# Patient Record
Sex: MALE | Race: WHITE | NOT HISPANIC OR LATINO | Employment: OTHER | URBAN - METROPOLITAN AREA
[De-identification: names, ages, dates, MRNs, and addresses within clinical notes are randomized per-mention and may not be internally consistent; named-entity substitution may affect disease eponyms.]

---

## 2024-01-04 ENCOUNTER — HOSPITAL ENCOUNTER (INPATIENT)
Facility: HOSPITAL | Age: 59
LOS: 2 days | Discharge: HOME | DRG: 174 | End: 2024-01-06
Attending: EMERGENCY MEDICINE | Admitting: FAMILY MEDICINE
Payer: COMMERCIAL

## 2024-01-04 ENCOUNTER — APPOINTMENT (EMERGENCY)
Dept: CT IMAGING | Facility: HOSPITAL | Age: 59
DRG: 174 | End: 2024-01-04
Payer: COMMERCIAL

## 2024-01-04 ENCOUNTER — APPOINTMENT (EMERGENCY)
Dept: RADIOLOGY | Facility: HOSPITAL | Age: 59
DRG: 174 | End: 2024-01-04
Payer: COMMERCIAL

## 2024-01-04 ENCOUNTER — APPOINTMENT (INPATIENT)
Dept: CT IMAGING | Facility: HOSPITAL | Age: 59
DRG: 174 | End: 2024-01-04
Payer: COMMERCIAL

## 2024-01-04 DIAGNOSIS — R00.1 BRADYCARDIA, SINUS: ICD-10-CM

## 2024-01-04 DIAGNOSIS — R11.2 NAUSEA AND VOMITING, UNSPECIFIED VOMITING TYPE: ICD-10-CM

## 2024-01-04 DIAGNOSIS — R07.9 CHEST PAIN, UNSPECIFIED TYPE: ICD-10-CM

## 2024-01-04 DIAGNOSIS — R79.89 ELEVATED TROPONIN: ICD-10-CM

## 2024-01-04 DIAGNOSIS — I21.4 NSTEMI (NON-ST ELEVATED MYOCARDIAL INFARCTION) (HCC): Primary | ICD-10-CM

## 2024-01-04 PROBLEM — D72.829 LEUKOCYTOSIS: Status: ACTIVE | Noted: 2024-01-04

## 2024-01-04 PROBLEM — R07.89 OTHER CHEST PAIN: Status: ACTIVE | Noted: 2024-01-04

## 2024-01-04 PROBLEM — I16.0 HYPERTENSIVE URGENCY: Status: ACTIVE | Noted: 2024-01-04

## 2024-01-04 LAB
2HR DELTA HS TROPONIN: 47 NG/L
4HR DELTA HS TROPONIN: 259 NG/L
ALBUMIN SERPL BCP-MCNC: 5 G/DL (ref 3.5–5)
ALP SERPL-CCNC: 91 U/L (ref 34–104)
ALT SERPL W P-5'-P-CCNC: 42 U/L (ref 7–52)
ANION GAP SERPL CALCULATED.3IONS-SCNC: 9 MMOL/L
APTT PPP: 26 SECONDS (ref 23–37)
APTT PPP: 39 SECONDS (ref 23–37)
AST SERPL W P-5'-P-CCNC: 23 U/L (ref 13–39)
ATRIAL RATE: 48 BPM
ATRIAL RATE: 49 BPM
ATRIAL RATE: 65 BPM
ATRIAL RATE: 70 BPM
BASOPHILS # BLD AUTO: 0.09 THOUSANDS/ÂΜL (ref 0–0.1)
BASOPHILS NFR BLD AUTO: 1 % (ref 0–1)
BILIRUB SERPL-MCNC: 0.45 MG/DL (ref 0.2–1)
BUN SERPL-MCNC: 18 MG/DL (ref 5–25)
CALCIUM SERPL-MCNC: 9.7 MG/DL (ref 8.4–10.2)
CARDIAC TROPONIN I PNL SERPL HS: 267 NG/L
CARDIAC TROPONIN I PNL SERPL HS: 55 NG/L
CARDIAC TROPONIN I PNL SERPL HS: 8 NG/L
CHLORIDE SERPL-SCNC: 103 MMOL/L (ref 96–108)
CO2 SERPL-SCNC: 24 MMOL/L (ref 21–32)
CREAT SERPL-MCNC: 1 MG/DL (ref 0.6–1.3)
EOSINOPHIL # BLD AUTO: 0.22 THOUSAND/ÂΜL (ref 0–0.61)
EOSINOPHIL NFR BLD AUTO: 2 % (ref 0–6)
ERYTHROCYTE [DISTWIDTH] IN BLOOD BY AUTOMATED COUNT: 12.6 % (ref 11.6–15.1)
FLUAV RNA RESP QL NAA+PROBE: NEGATIVE
FLUBV RNA RESP QL NAA+PROBE: NEGATIVE
GFR SERPL CREATININE-BSD FRML MDRD: 81 ML/MIN/1.73SQ M
GLUCOSE SERPL-MCNC: 103 MG/DL (ref 65–140)
HCT VFR BLD AUTO: 44.6 % (ref 36.5–49.3)
HGB BLD-MCNC: 15.1 G/DL (ref 12–17)
IMM GRANULOCYTES # BLD AUTO: 0.03 THOUSAND/UL (ref 0–0.2)
IMM GRANULOCYTES NFR BLD AUTO: 0 % (ref 0–2)
INR PPP: 0.92 (ref 0.84–1.19)
LYMPHOCYTES # BLD AUTO: 4.4 THOUSANDS/ÂΜL (ref 0.6–4.47)
LYMPHOCYTES NFR BLD AUTO: 42 % (ref 14–44)
MCH RBC QN AUTO: 28.5 PG (ref 26.8–34.3)
MCHC RBC AUTO-ENTMCNC: 33.9 G/DL (ref 31.4–37.4)
MCV RBC AUTO: 84 FL (ref 82–98)
MONOCYTES # BLD AUTO: 1.11 THOUSAND/ÂΜL (ref 0.17–1.22)
MONOCYTES NFR BLD AUTO: 11 % (ref 4–12)
NEUTROPHILS # BLD AUTO: 4.61 THOUSANDS/ÂΜL (ref 1.85–7.62)
NEUTS SEG NFR BLD AUTO: 44 % (ref 43–75)
NRBC BLD AUTO-RTO: 0 /100 WBCS
P AXIS: 44 DEGREES
P AXIS: 46 DEGREES
P AXIS: 57 DEGREES
P AXIS: 67 DEGREES
PLATELET # BLD AUTO: 320 THOUSANDS/UL (ref 149–390)
PMV BLD AUTO: 9 FL (ref 8.9–12.7)
POTASSIUM SERPL-SCNC: 4 MMOL/L (ref 3.5–5.3)
PR INTERVAL: 172 MS
PR INTERVAL: 174 MS
PR INTERVAL: 174 MS
PR INTERVAL: 178 MS
PROT SERPL-MCNC: 7.7 G/DL (ref 6.4–8.4)
PROTHROMBIN TIME: 12.9 SECONDS (ref 11.6–14.5)
QRS AXIS: 11 DEGREES
QRS AXIS: 20 DEGREES
QRS AXIS: 21 DEGREES
QRS AXIS: 48 DEGREES
QRSD INTERVAL: 78 MS
QRSD INTERVAL: 78 MS
QRSD INTERVAL: 82 MS
QRSD INTERVAL: 84 MS
QT INTERVAL: 388 MS
QT INTERVAL: 402 MS
QT INTERVAL: 436 MS
QT INTERVAL: 454 MS
QTC INTERVAL: 389 MS
QTC INTERVAL: 410 MS
QTC INTERVAL: 418 MS
QTC INTERVAL: 419 MS
RBC # BLD AUTO: 5.29 MILLION/UL (ref 3.88–5.62)
RSV RNA RESP QL NAA+PROBE: NEGATIVE
SARS-COV-2 RNA RESP QL NAA+PROBE: NEGATIVE
SODIUM SERPL-SCNC: 136 MMOL/L (ref 135–147)
T WAVE AXIS: 12 DEGREES
T WAVE AXIS: 20 DEGREES
T WAVE AXIS: 44 DEGREES
T WAVE AXIS: 9 DEGREES
VENTRICULAR RATE: 48 BPM
VENTRICULAR RATE: 49 BPM
VENTRICULAR RATE: 65 BPM
VENTRICULAR RATE: 70 BPM
WBC # BLD AUTO: 10.46 THOUSAND/UL (ref 4.31–10.16)

## 2024-01-04 PROCEDURE — 85730 THROMBOPLASTIN TIME PARTIAL: CPT | Performed by: EMERGENCY MEDICINE

## 2024-01-04 PROCEDURE — 99291 CRITICAL CARE FIRST HOUR: CPT | Performed by: EMERGENCY MEDICINE

## 2024-01-04 PROCEDURE — 85610 PROTHROMBIN TIME: CPT | Performed by: EMERGENCY MEDICINE

## 2024-01-04 PROCEDURE — 96374 THER/PROPH/DIAG INJ IV PUSH: CPT

## 2024-01-04 PROCEDURE — 93010 ELECTROCARDIOGRAM REPORT: CPT | Performed by: INTERNAL MEDICINE

## 2024-01-04 PROCEDURE — 71045 X-RAY EXAM CHEST 1 VIEW: CPT

## 2024-01-04 PROCEDURE — 71275 CT ANGIOGRAPHY CHEST: CPT

## 2024-01-04 PROCEDURE — 36415 COLL VENOUS BLD VENIPUNCTURE: CPT

## 2024-01-04 PROCEDURE — 73200 CT UPPER EXTREMITY W/O DYE: CPT

## 2024-01-04 PROCEDURE — 85025 COMPLETE CBC W/AUTO DIFF WBC: CPT | Performed by: EMERGENCY MEDICINE

## 2024-01-04 PROCEDURE — 93005 ELECTROCARDIOGRAM TRACING: CPT

## 2024-01-04 PROCEDURE — 99223 1ST HOSP IP/OBS HIGH 75: CPT | Performed by: FAMILY MEDICINE

## 2024-01-04 PROCEDURE — 72125 CT NECK SPINE W/O DYE: CPT

## 2024-01-04 PROCEDURE — 85730 THROMBOPLASTIN TIME PARTIAL: CPT | Performed by: FAMILY MEDICINE

## 2024-01-04 PROCEDURE — 96375 TX/PRO/DX INJ NEW DRUG ADDON: CPT

## 2024-01-04 PROCEDURE — 99285 EMERGENCY DEPT VISIT HI MDM: CPT

## 2024-01-04 PROCEDURE — 84484 ASSAY OF TROPONIN QUANT: CPT | Performed by: EMERGENCY MEDICINE

## 2024-01-04 PROCEDURE — 0241U HB NFCT DS VIR RESP RNA 4 TRGT: CPT | Performed by: EMERGENCY MEDICINE

## 2024-01-04 PROCEDURE — 80053 COMPREHEN METABOLIC PANEL: CPT | Performed by: EMERGENCY MEDICINE

## 2024-01-04 PROCEDURE — 74174 CTA ABD&PLVS W/CONTRAST: CPT

## 2024-01-04 RX ORDER — MORPHINE SULFATE 10 MG/ML
6 INJECTION, SOLUTION INTRAMUSCULAR; INTRAVENOUS ONCE
Status: COMPLETED | OUTPATIENT
Start: 2024-01-04 | End: 2024-01-04

## 2024-01-04 RX ORDER — CITALOPRAM 20 MG/1
20 TABLET ORAL DAILY
COMMUNITY

## 2024-01-04 RX ORDER — HEPARIN SODIUM 1000 [USP'U]/ML
2000 INJECTION, SOLUTION INTRAVENOUS; SUBCUTANEOUS EVERY 6 HOURS PRN
Status: DISCONTINUED | OUTPATIENT
Start: 2024-01-04 | End: 2024-01-05

## 2024-01-04 RX ORDER — HEPARIN SODIUM 1000 [USP'U]/ML
4000 INJECTION, SOLUTION INTRAVENOUS; SUBCUTANEOUS ONCE
Status: COMPLETED | OUTPATIENT
Start: 2024-01-04 | End: 2024-01-04

## 2024-01-04 RX ORDER — HEPARIN SODIUM 1000 [USP'U]/ML
4000 INJECTION, SOLUTION INTRAVENOUS; SUBCUTANEOUS EVERY 6 HOURS PRN
Status: DISCONTINUED | OUTPATIENT
Start: 2024-01-04 | End: 2024-01-05

## 2024-01-04 RX ORDER — PANTOPRAZOLE SODIUM 40 MG/1
40 TABLET, DELAYED RELEASE ORAL
Status: DISCONTINUED | OUTPATIENT
Start: 2024-01-05 | End: 2024-01-06 | Stop reason: HOSPADM

## 2024-01-04 RX ORDER — NITROGLYCERIN 0.4 MG/1
0.4 TABLET SUBLINGUAL ONCE
Status: COMPLETED | OUTPATIENT
Start: 2024-01-04 | End: 2024-01-04

## 2024-01-04 RX ORDER — HEPARIN SODIUM 10000 [USP'U]/100ML
3-20 INJECTION, SOLUTION INTRAVENOUS
Status: DISCONTINUED | OUTPATIENT
Start: 2024-01-04 | End: 2024-01-05

## 2024-01-04 RX ORDER — HYDROMORPHONE HCL/PF 1 MG/ML
1 SYRINGE (ML) INJECTION ONCE
Status: COMPLETED | OUTPATIENT
Start: 2024-01-04 | End: 2024-01-04

## 2024-01-04 RX ORDER — OMEPRAZOLE 40 MG/1
40 CAPSULE, DELAYED RELEASE ORAL DAILY
COMMUNITY

## 2024-01-04 RX ORDER — ACETAMINOPHEN 325 MG/1
650 TABLET ORAL EVERY 6 HOURS PRN
Status: DISCONTINUED | OUTPATIENT
Start: 2024-01-04 | End: 2024-01-06 | Stop reason: HOSPADM

## 2024-01-04 RX ORDER — ASPIRIN 81 MG/1
81 TABLET, CHEWABLE ORAL DAILY
Status: DISCONTINUED | OUTPATIENT
Start: 2024-01-05 | End: 2024-01-06 | Stop reason: HOSPADM

## 2024-01-04 RX ORDER — CITALOPRAM 20 MG/1
20 TABLET ORAL DAILY
Status: DISCONTINUED | OUTPATIENT
Start: 2024-01-05 | End: 2024-01-06 | Stop reason: HOSPADM

## 2024-01-04 RX ORDER — LANOLIN ALCOHOL/MO/W.PET/CERES
3 CREAM (GRAM) TOPICAL ONCE
Status: COMPLETED | OUTPATIENT
Start: 2024-01-04 | End: 2024-01-04

## 2024-01-04 RX ORDER — ASPIRIN 81 MG/1
324 TABLET, CHEWABLE ORAL ONCE
Status: COMPLETED | OUTPATIENT
Start: 2024-01-04 | End: 2024-01-04

## 2024-01-04 RX ORDER — ATENOLOL 25 MG/1
25 TABLET ORAL DAILY
COMMUNITY
End: 2024-01-06

## 2024-01-04 RX ADMIN — MORPHINE SULFATE 6 MG: 10 INJECTION INTRAVENOUS at 13:03

## 2024-01-04 RX ADMIN — HEPARIN SODIUM 11.1 UNITS/KG/HR: 10000 INJECTION, SOLUTION INTRAVENOUS at 15:43

## 2024-01-04 RX ADMIN — Medication 3 MG: at 22:25

## 2024-01-04 RX ADMIN — HEPARIN SODIUM 4000 UNITS: 1000 INJECTION INTRAVENOUS; SUBCUTANEOUS at 15:42

## 2024-01-04 RX ADMIN — HEPARIN SODIUM 4000 UNITS: 1000 INJECTION INTRAVENOUS; SUBCUTANEOUS at 22:24

## 2024-01-04 RX ADMIN — NITROGLYCERIN 0.4 MG: 0.4 TABLET SUBLINGUAL at 12:43

## 2024-01-04 RX ADMIN — IOHEXOL 100 ML: 350 INJECTION, SOLUTION INTRAVENOUS at 13:11

## 2024-01-04 RX ADMIN — NITROGLYCERIN 1 INCH: 20 OINTMENT TOPICAL at 13:21

## 2024-01-04 RX ADMIN — ASPIRIN 324 MG: 81 TABLET, CHEWABLE ORAL at 12:44

## 2024-01-04 RX ADMIN — HYDROMORPHONE HYDROCHLORIDE 1 MG: 1 INJECTION, SOLUTION INTRAMUSCULAR; INTRAVENOUS; SUBCUTANEOUS at 14:14

## 2024-01-04 NOTE — ASSESSMENT & PLAN NOTE
Patient presented to the ED with complaints of sudden onset of chest pain with nausea/diaphoresis for 2 hours prior to coming into the hospital.  No records to review, EKG with NSR.  Monitor on telemetry  Cardiology consulted, appreciate input  Will keep patient NPO after midnight in anticipation for possible cardiac cath.

## 2024-01-04 NOTE — ASSESSMENT & PLAN NOTE
Initial trop on admission was 8, 2 hr delta increased to 55, 4 hr is pending.  Initiated on Heparin drip, will continue at this time  Cardiology consult placed  SL Nitro as needed for chest pain  If 3rd troponin continues to rise, will continue to trend until peak.

## 2024-01-04 NOTE — ASSESSMENT & PLAN NOTE
Noted in the ER, heart rate in 40-50s  Patient cannot recall if he has history of SB or not.  Does state he has some dizziness at this time  Will continue to monitor patient on telemetry

## 2024-01-04 NOTE — ASSESSMENT & PLAN NOTE
Present on admission, evidenced by blood pressures ranging from 163-198/  Patient does report history of hypertension and reports that he is taking 25 mg Atenolol daily at home.  Blood pressure is improving in the ER, 146/85

## 2024-01-04 NOTE — ASSESSMENT & PLAN NOTE
Present on admission with a leukocytosis of 10.46  Not meeting SIRS criteria  Likely reactive, no sign of active infection  Will continue to monitor

## 2024-01-04 NOTE — H&P
[facility]  H&P  Name: Jaime Noble 58 y.o. male I MRN: [id number]  Unit/Bed#: ED 17 I Date of Admission: 1/4/2024   Date of Service: 1/4/2024 I Hospital Day: 0      Assessment/Plan   * Chest pain, unspecified  Assessment & Plan  Patient presented to the ED with complaints of sudden onset of chest pain with nausea/diaphoresis for 2 hours prior to coming into the hospital.  No records to review, EKG with NSR.  Monitor on telemetry  Cardiology consulted, appreciate input  Will keep patient NPO after midnight in anticipation for possible cardiac cath.    Bradycardia, sinus  Assessment & Plan  Noted in the ER, heart rate in 40-50s  Patient cannot recall if he has history of SB or not.  Does state he has some dizziness at this time  Will continue to monitor patient on telemetry    Hypertensive urgency  Assessment & Plan  Present on admission, evidenced by blood pressures ranging from 163-198/  Patient does report history of hypertension and reports that he is taking 25 mg Atenolol daily at home.  Blood pressure is improving in the ER, 146/85    Elevated troponin  Assessment & Plan  Initial trop on admission was 8, 2 hr delta increased to 55, 4 hr is pending.  Initiated on Heparin drip, will continue at this time  Cardiology consult placed  SL Nitro as needed for chest pain  If 3rd troponin continues to rise, will continue to trend until peak.    Leukocytosis  Assessment & Plan  Present on admission with a leukocytosis of 10.46  Not meeting SIRS criteria  Likely reactive, no sign of active infection  Will continue to monitor           VTE Pharmacologic Prophylaxis: VTE Score: 3 Moderate Risk (Score 3-4) - Pharmacological DVT Prophylaxis Ordered: heparin.  Code Status: No Order Full Code  Discussion with family: Patient declined call to .     Anticipated Length of Stay: Patient will be admitted on an inpatient basis with an anticipated length of stay of greater than 2 midnights  secondary to Chest pain, elevated troponin levels and need for cardiology evaluation.    Total Time Spent on Date of Encounter in care of patient: 65 mins. This time was spent on one or more of the following: performing physical exam; counseling and coordination of care; obtaining or reviewing history; documenting in the medical record; reviewing/ordering tests, medications or procedures; communicating with other healthcare professionals and discussing with patient's family/caregivers.    Chief Complaint: Chest pain, nausea/diaphoresis, and arm pain    History of Present Illness:  Jaime Noble is a 58 y.o. male with a PMH of Hypertension who presents with chest pain that began approx 2 hours prior to presentation to the hospital, including diaphoresis and nausea.  Also endorses complaints of dizziness.  Kathrine any new medications or vitamins added to his regimen.  Denies history of hyperlipidemia.  No history of CAD that he knows of.    Review of Systems:  Review of Systems   Constitutional:  Positive for diaphoresis. Negative for activity change, appetite change, chills and fever.   HENT:  Negative for congestion, dental problem, ear pain and sore throat.    Eyes:  Negative for pain and visual disturbance.   Respiratory:  Negative for apnea, cough, chest tightness and shortness of breath.    Cardiovascular:  Positive for chest pain. Negative for palpitations and leg swelling.   Gastrointestinal:  Positive for nausea. Negative for abdominal pain, constipation and vomiting.   Endocrine: Negative for cold intolerance, heat intolerance, polydipsia and polyphagia.   Genitourinary:  Negative for difficulty urinating, dysuria, hematuria and scrotal swelling.   Musculoskeletal:  Positive for arthralgias (Right shoulder pain). Negative for back pain, myalgias and neck pain.   Skin:  Negative for color change and rash.   Allergic/Immunologic: Negative for environmental allergies, food allergies and immunocompromised state.    Neurological:  Positive for dizziness. Negative for seizures and syncope.   Hematological:  Negative for adenopathy. Does not bruise/bleed easily.   Psychiatric/Behavioral:  Negative for agitation, decreased concentration and dysphoric mood. The patient is not nervous/anxious.    All other systems reviewed and are negative.      Past Medical and Surgical History:   Past Medical History:   Diagnosis Date    Hypertension     Reflux gastritis        Past Surgical History:   Procedure Laterality Date    NOSE SURGERY         Meds/Allergies:  Prior to Admission medications    Medication Sig Start Date End Date Taking? Authorizing Provider   atenolol (TENORMIN) 25 mg tablet Take 25 mg by mouth daily   Yes Historical Provider, MD   citalopram (CeleXA) 20 mg tablet Take 20 mg by mouth daily   Yes Historical Provider, MD   omeprazole (PriLOSEC) 40 MG capsule Take 40 mg by mouth daily   Yes Historical Provider, MD     I have reviewed home medications with patient personally.    Allergies:   Allergies   Allergen Reactions    Ketorolac Rash       Social History:  Marital Status: Single   Occupation:   Patient Pre-hospital Living Situation: Home  Patient Pre-hospital Level of Mobility: walks  Patient Pre-hospital Diet Restrictions:   Substance Use History:   Social History     Substance and Sexual Activity   Alcohol Use Never     Social History     Tobacco Use   Smoking Status Never   Smokeless Tobacco Never     Social History     Substance and Sexual Activity   Drug Use Never       Family History:  History reviewed. No pertinent family history.    Physical Exam:     Vitals:   Blood Pressure: 140/76 (01/04/24 1511)  Pulse: 56 (01/04/24 1511)  Temperature: (!) 97.4 °F (36.3 °C) (01/04/24 1234)  Temp Source: Temporal (01/04/24 1234)  Respirations: 20 (01/04/24 1511)  Weight - Scale: 97.4 kg (214 lb 11.7 oz) (01/04/24 1332)  SpO2: 92 % (01/04/24 1511)    Physical Exam  Vitals and nursing note reviewed.   Constitutional:        General: He is not in acute distress.     Appearance: He is obese.   Cardiovascular:      Rate and Rhythm: Regular rhythm. Bradycardia present.      Pulses: Normal pulses.      Heart sounds: Normal heart sounds.   Pulmonary:      Effort: Pulmonary effort is normal.      Breath sounds: Normal breath sounds.   Abdominal:      General: Bowel sounds are normal.      Palpations: Abdomen is soft.   Musculoskeletal:         General: Normal range of motion.      Right lower leg: No edema.      Left lower leg: No edema.   Skin:     General: Skin is warm and dry.   Neurological:      Mental Status: He is alert and oriented to person, place, and time.   Psychiatric:         Mood and Affect: Mood normal.          Additional Data:     Lab Results:  Results from last 7 days   Lab Units 01/04/24  1239   WBC Thousand/uL 10.46*   HEMOGLOBIN g/dL 15.1   HEMATOCRIT % 44.6   PLATELETS Thousands/uL 320   NEUTROS PCT % 44   LYMPHS PCT % 42   MONOS PCT % 11   EOS PCT % 2     Results from last 7 days   Lab Units 01/04/24  1239   SODIUM mmol/L 136   POTASSIUM mmol/L 4.0   CHLORIDE mmol/L 103   CO2 mmol/L 24   BUN mg/dL 18   CREATININE mg/dL 1.00   ANION GAP mmol/L 9   CALCIUM mg/dL 9.7   ALBUMIN g/dL 5.0   TOTAL BILIRUBIN mg/dL 0.45   ALK PHOS U/L 91   ALT U/L 42   AST U/L 23   GLUCOSE RANDOM mg/dL 103     Results from last 7 days   Lab Units 01/04/24  1534   INR  0.92                   Lines/Drains:  Invasive Devices       Peripheral Intravenous Line  Duration             Peripheral IV 01/04/24 Dorsal (posterior);Right Hand <1 day    Peripheral IV 01/04/24 Left;Dorsal (posterior) Forearm <1 day                        Imaging: Reviewed radiology reports from this admission including: chest CT scan and abdominal/pelvic CT  CTA dissection protocol chest/abdomen/pelvis   Final Result by Martir Hdz MD (01/04 1400)      1.  No acute aortic, thoracic or abdominopelvic pathology.   2.  Hepatic steatosis.               Workstation performed:  [id number]         XR chest 1 view portable   Final Result by Martir Hdz MD (01/04 1400)      No acute cardiopulmonary disease.                  Workstation performed: [id number]             EKG and Other Studies Reviewed on Admission:   EKG: Sinus Bradycardia. HR 40-50.    ** Please Note: This note has been constructed using a voice recognition system. **

## 2024-01-04 NOTE — ED PROVIDER NOTES
History  Chief Complaint   Patient presents with    Chest Pain     Patient c/o chest pain that started this morning. Patient c/o right arm heaviness.     CP and diffuse aching pressure like pain in both shoulders, onset 2 hours ago. Nausea and cold sweats but no vomiting. No dyspnea. Pain radiates into R shoulder and R biceps. No h/o similar pain in past. No h/o CAD. Nonsmoker. No leg pain or swelling. No cough or hemoptysis.         None       Past Medical History:   Diagnosis Date    Hypertension     Reflux gastritis        Past Surgical History:   Procedure Laterality Date    NOSE SURGERY         History reviewed. No pertinent family history.  I have reviewed and agree with the history as documented.    E-Cigarette/Vaping    E-Cigarette Use Never User      E-Cigarette/Vaping Substances     Social History     Tobacco Use    Smoking status: Never    Smokeless tobacco: Never   Vaping Use    Vaping status: Never Used   Substance Use Topics    Alcohol use: Never    Drug use: Never       Review of Systems   Constitutional:  Positive for diaphoresis. Negative for chills and fever.   Cardiovascular:  Positive for chest pain.       Physical Exam  Physical Exam  Vitals and nursing note reviewed.   Constitutional:       General: He is not in acute distress.     Appearance: He is well-developed. He is ill-appearing. He is not toxic-appearing or diaphoretic.   HENT:      Head: Normocephalic and atraumatic.      Mouth/Throat:      Mouth: Mucous membranes are moist.      Pharynx: Oropharynx is clear.   Eyes:      Conjunctiva/sclera: Conjunctivae normal.      Pupils: Pupils are equal, round, and reactive to light.   Neck:      Vascular: No JVD.   Cardiovascular:      Rate and Rhythm: Normal rate and regular rhythm.      Pulses: Normal pulses.      Heart sounds: Normal heart sounds. No murmur heard.     No friction rub. No gallop.   Pulmonary:      Effort: Pulmonary effort is normal. No respiratory distress.      Breath sounds:  Normal breath sounds. No stridor. No wheezing or rales.   Abdominal:      General: There is no distension.      Palpations: Abdomen is soft.      Tenderness: There is no abdominal tenderness. There is no guarding or rebound.   Musculoskeletal:         General: No swelling, tenderness, deformity or signs of injury. Normal range of motion.      Cervical back: Normal range of motion and neck supple. No rigidity.   Skin:     General: Skin is warm and dry.      Capillary Refill: Capillary refill takes less than 2 seconds.      Coloration: Skin is not cyanotic, jaundiced or pale.      Findings: No bruising, ecchymosis or erythema.   Neurological:      General: No focal deficit present.      Mental Status: He is alert and oriented to person, place, and time.      Cranial Nerves: No cranial nerve deficit.      Sensory: No sensory deficit.      Motor: No weakness or abnormal muscle tone.      Coordination: Coordination normal.      Gait: Gait normal.         Vital Signs  ED Triage Vitals   Temperature Pulse Respirations Blood Pressure SpO2   01/04/24 1234 01/04/24 1234 01/04/24 1234 01/04/24 1234 01/04/24 1234   (!) 97.4 °F (36.3 °C) 65 20 (!) 186/120 97 %      Temp Source Heart Rate Source Patient Position - Orthostatic VS BP Location FiO2 (%)   01/04/24 1234 01/04/24 1234 01/04/24 1234 01/04/24 1234 --   Temporal Monitor Sitting Left arm       Pain Score       01/04/24 1303       9           Vitals:    01/04/24 1300 01/04/24 1330 01/04/24 1400 01/04/24 1511   BP: (!) 163/108 (!) 168/102 (!) 198/99 140/76   Pulse: 61 61 59 56   Patient Position - Orthostatic VS: Sitting Sitting Sitting Lying         Visual Acuity      ED Medications  Medications   heparin (porcine) injection 4,000 Units (has no administration in time range)   heparin (porcine) 25,000 units in 0.45% NaCl 250 mL infusion (premix) (has no administration in time range)   heparin (porcine) injection 4,000 Units (has no administration in time range)   heparin  (porcine) injection 2,000 Units (has no administration in time range)   nitroglycerin (NITROSTAT) SL tablet 0.4 mg (0.4 mg Sublingual Given 1/4/24 1243)   aspirin chewable tablet 324 mg (324 mg Oral Given 1/4/24 1244)   morphine injection 6 mg (6 mg Intravenous Given 1/4/24 1303)   iohexol (OMNIPAQUE) 350 MG/ML injection (MULTI-DOSE) 100 mL (100 mL Intravenous Given 1/4/24 1311)   nitroglycerin (NITRO-BID) 2 % TD ointment 1 inch (1 inch Topical Given 1/4/24 1321)   HYDROmorphone (DILAUDID) injection 1 mg (1 mg Intravenous Given 1/4/24 1414)       Diagnostic Studies  Results Reviewed       Procedure Component Value Units Date/Time    APTT [[id number]] Collected: 01/04/24 1534    Lab Status: In process Specimen: Blood from Arm, Right Updated: 01/04/24 1537    Protime-INR [[id number]] Collected: 01/04/24 1534    Lab Status: In process Specimen: Blood from Arm, Right Updated: 01/04/24 1537    HS Troponin I 2hr [[id number]]  (Abnormal) Collected: 01/04/24 1438    Lab Status: Final result Specimen: Blood from Hand, Right Updated: 01/04/24 1513     hs TnI 2hr 55 ng/L      Delta 2hr hsTnI 47 ng/L     HS Troponin I 4hr [[id number]]     Lab Status: No result Specimen: Blood     FLU/RSV/COVID - if FLU/RSV clinically relevant [[id number]]  (Normal) Collected: 01/04/24 1301    Lab Status: Final result Specimen: Nares from Nose Updated: 01/04/24 1351     SARS-CoV-2 Negative     INFLUENZA A PCR Negative     INFLUENZA B PCR Negative     RSV PCR Negative    Narrative:      FOR PEDIATRIC PATIENTS - copy/paste COVID Guidelines URL to browser: [email or web address]    SARS-CoV-2 assay is a Nucleic Acid Amplification assay intended for the  qualitative detection of nucleic acid from SARS-CoV-2 in nasopharyngeal  swabs. Results are for the presumptive identification of SARS-CoV-2 RNA.    Positive results are indicative of infection with SARS-CoV-2, the virus  causing COVID-19, but do not  rule out bacterial infection or co-infection  with other viruses. Laboratories within the [place] and its  territories are required to report all positive results to the appropriate  public health authorities. Negative results do not preclude SARS-CoV-2  infection and should not be used as the sole basis for treatment or other  patient management decisions. Negative results must be combined with  clinical observations, patient history, and epidemiological information.  This test has not been FDA cleared or approved.    This test has been authorized by FDA under an Emergency Use Authorization  (EUA). This test is only authorized for the duration of time the  declaration that circumstances exist justifying the authorization of the  emergency use of an in vitro diagnostic tests for detection of SARS-CoV-2  virus and/or diagnosis of COVID-19 infection under section 564(b)(1) of  the Act, 21 U.S.C. 360bbb-3(b)(1), unless the authorization is terminated  or revoked sooner. The test has been validated but independent review by FDA  and CLIA is pending.    Test performed using [employer] Gene[employer]pert: This RT-PCR assay targets N2,  a region unique to SARS-CoV-2. A conserved region in the E-gene was chosen  for pan-Sarbecovirus detection which includes SARS-CoV-2.    According to CMS-2020-01-R, this platform meets the definition of high-throughput technology.    HS Troponin 0hr (reflex protocol) [[id number]]  (Normal) Collected: 01/04/24 1239    Lab Status: Final result Specimen: Blood from Arm, Left Updated: 01/04/24 1317     hs TnI 0hr 8 ng/L     Comprehensive metabolic panel [[id number]] Collected: 01/04/24 1239    Lab Status: Final result Specimen: Blood from Arm, Left Updated: 01/04/24 1309     Sodium 136 mmol/L      Potassium 4.0 mmol/L      Chloride 103 mmol/L      CO2 24 mmol/L      ANION GAP 9 mmol/L      BUN 18 mg/dL      Creatinine 1.00 mg/dL      Glucose 103 mg/dL      Calcium 9.7 mg/dL      AST 23 U/L      ALT 42  U/L      Alkaline Phosphatase 91 U/L      Total Protein 7.7 g/dL      Albumin 5.0 g/dL      Total Bilirubin 0.45 mg/dL      eGFR 81 ml/min/1.73sq m     Narrative:      National Kidney Disease Foundation guidelines for Chronic Kidney Disease (CKD):     Stage 1 with normal or high GFR (GFR > 90 mL/min/1.73 square meters)    Stage 2 Mild CKD (GFR = 60-89 mL/min/1.73 square meters)    Stage 3A Moderate CKD (GFR = 45-59 mL/min/1.73 square meters)    Stage 3B Moderate CKD (GFR = 30-44 mL/min/1.73 square meters)    Stage 4 Severe CKD (GFR = 15-29 mL/min/1.73 square meters)    Stage 5 End Stage CKD (GFR <15 mL/min/1.73 square meters)  Note: GFR calculation is accurate only with a steady state creatinine    CBC and differential [[id number]]  (Abnormal) Collected: 01/04/24 1239    Lab Status: Final result Specimen: Blood from Arm, Left Updated: 01/04/24 1251     WBC 10.46 Thousand/uL      RBC 5.29 Million/uL      Hemoglobin 15.1 g/dL      Hematocrit 44.6 %      MCV 84 fL      MCH 28.5 pg      MCHC 33.9 g/dL      RDW 12.6 %      MPV 9.0 fL      Platelets 320 Thousands/uL      nRBC 0 /100 WBCs      Neutrophils Relative 44 %      Immat GRANS % 0 %      Lymphocytes Relative 42 %      Monocytes Relative 11 %      Eosinophils Relative 2 %      Basophils Relative 1 %      Neutrophils Absolute 4.61 Thousands/µL      Immature Grans Absolute 0.03 Thousand/uL      Lymphocytes Absolute 4.40 Thousands/µL      Monocytes Absolute 1.11 Thousand/µL      Eosinophils Absolute 0.22 Thousand/µL      Basophils Absolute 0.09 Thousands/µL                    CTA dissection protocol chest/abdomen/pelvis   Final Result by Martir Hdz MD (01/04 1400)      1.  No acute aortic, thoracic or abdominopelvic pathology.   2.  Hepatic steatosis.               Workstation performed: [id number]         XR chest 1 view portable   Final Result by Martir Hdz MD (01/04 1400)      No acute cardiopulmonary disease.                  Workstation performed:  [id number]                    Procedures  ECG 12 Lead Documentation Only    Date/Time: 1/4/2024 12:37 PM    Performed by: Adolph Carreno MD  Authorized by: Adolph Carreno MD    ECG reviewed by me, the ED Provider: yes    Patient location:  ED  Previous ECG:     Previous ECG:  Unavailable  Interpretation:     Interpretation: normal    Rate:     ECG rate:  70    ECG rate assessment: normal    Rhythm:     Rhythm: sinus rhythm    Comments:      No edward or std. Normal EKG without evidence of acute ischemia.   ECG 12 Lead Documentation Only    Date/Time: 1/4/2024 12:46 PM    Performed by: Adolph Carreno MD  Authorized by: Adolph Carreno MD    Indications / Diagnosis:  CP  ECG reviewed by me, the ED Provider: yes    Patient location:  ED  Previous ECG:     Previous ECG:  Compared to current    Similarity:  No change  Interpretation:     Interpretation: normal    Rate:     ECG rate:  65    ECG rate assessment: normal    Rhythm:     Rhythm: sinus rhythm    Comments:      Normal EKG. No interval change. No acute ischemia.   CriticalCare Time    Date/Time: 1/4/2024 3:28 PM    Performed by: Adolph Carreno MD  Authorized by: Adolph Carreno MD    Critical care provider statement:     Critical care time (minutes):  35    Critical care time was exclusive of:  Separately billable procedures and treating other patients    Critical care was necessary to treat or prevent imminent or life-threatening deterioration of the following conditions:  Cardiac failure  Comments:      Diagnosis and treatment of NSTEMI           ED Course  ED Course as of 01/04/24 1542   Thu Jan 04, 2024   1254 Reexamined. No significant change in pain with ntg.    1300 Pt reports pain unalleviated and now worsening R arm pain. Will give morphine and perform CTA to evaluate for dissection.    1414 CP has resolved. Pt c/o R shoulder pain. Will give dilaudid as he had no relief from morphine.    1442 Pain improved after dilaudid.    1528 Pt reexamined. We discussed  elevated troponin and it's implications. He states pain is starting to subside. Will start heparin and admit for further monitoring and treatment.              HEART Risk Score      Flowsheet Row Most Recent Value   Heart Score Risk Calculator    History 1 Filed at: 01/04/2024 1330   ECG 0 Filed at: 01/04/2024 1330   Age 1 Filed at: 01/04/2024 1330   Risk Factors 1 Filed at: 01/04/2024 1330   Troponin 0 Filed at: 01/04/2024 1330   HEART Score 3 Filed at: 01/04/2024 1330                          SBIRT 22yo+      Flowsheet Row Most Recent Value   Initial Alcohol Screen: US AUDIT-C     1. How often do you have a drink containing alcohol? 0 Filed at: 01/04/2024 1234   2. How many drinks containing alcohol do you have on a typical day you are drinking?  0 Filed at: 01/04/2024 1234   3a. Male UNDER 65: How often do you have five or more drinks on one occasion? 0 Filed at: 01/04/2024 1234   3b. FEMALE Any Age, or MALE 65+: How often do you have 4 or more drinks on one occassion? 0 Filed at: 01/04/2024 1234   Audit-C Score 0 Filed at: 01/04/2024 1234   FRANCISCA: How many times in the past year have you...    Used an illegal drug or used a prescription medication for non-medical reasons? Never Filed at: 01/04/2024 1234                      Medical Decision Making  Problems Addressed:  NSTEMI (non-ST elevated myocardial infarction) (HCC): complicated acute illness or injury with systemic symptoms that poses a threat to life or bodily functions    Amount and/or Complexity of Data Reviewed  Labs: ordered.  Radiology: ordered.    Risk  OTC drugs.  Prescription drug management.  Decision regarding hospitalization.             Disposition  Final diagnoses:   NSTEMI (non-ST elevated myocardial infarction) (HCC)     Time reflects when diagnosis was documented in both MDM as applicable and the Disposition within this note       Time User Action Codes Description Comment    1/4/2024  3:27 PM Adolph Carreno Add [I21.4] NSTEMI (non-ST  elevated myocardial infarction) (HCC)           ED Disposition       ED Disposition   Admit    Condition   Stable    Date/Time   u Jan 4, 2024 0876    Comment   Case was discussed with Dr Bo and the patient's admission status was agreed to be Admission Status: inpatient status to the service of Dr. Bo .               Follow-up Information    None         Patient's Medications    No medications on file       No discharge procedures on file.    PDMP Review       None            ED Provider  Electronically Signed by             Adolph Carreno MD  01/04/24 1116

## 2024-01-05 ENCOUNTER — APPOINTMENT (INPATIENT)
Dept: NON INVASIVE DIAGNOSTICS | Facility: HOSPITAL | Age: 59
DRG: 174 | End: 2024-01-05
Payer: COMMERCIAL

## 2024-01-05 PROBLEM — M43.02 CERVICAL SPONDYLOLYSIS: Status: ACTIVE | Noted: 2024-01-05

## 2024-01-05 PROBLEM — E78.2 MIXED HYPERLIPIDEMIA: Status: ACTIVE | Noted: 2024-01-05

## 2024-01-05 PROBLEM — D72.829 LEUKOCYTOSIS: Status: RESOLVED | Noted: 2024-01-04 | Resolved: 2024-01-05

## 2024-01-05 LAB
2HR DELTA HS TROPONIN: 2891 NG/L
4HR DELTA HS TROPONIN: 6168 NG/L
ANION GAP SERPL CALCULATED.3IONS-SCNC: 8 MMOL/L
AORTIC ROOT: 3.4 CM
APICAL FOUR CHAMBER EJECTION FRACTION: 54 %
APTT PPP: 75 SECONDS (ref 23–37)
APTT PPP: 80 SECONDS (ref 23–37)
ASCENDING AORTA: 3.3 CM
AV LVOT MEAN GRADIENT: 1 MMHG
AV LVOT PEAK GRADIENT: 3 MMHG
BUN SERPL-MCNC: 19 MG/DL (ref 5–25)
CALCIUM SERPL-MCNC: 9.3 MG/DL (ref 8.4–10.2)
CARDIAC TROPONIN I PNL SERPL HS: 4751 NG/L (ref 8–18)
CARDIAC TROPONIN I PNL SERPL HS: 7226 NG/L
CARDIAC TROPONIN I PNL SERPL HS: ABNORMAL NG/L
CARDIAC TROPONIN I PNL SERPL HS: ABNORMAL NG/L
CHLORIDE SERPL-SCNC: 105 MMOL/L (ref 96–108)
CHOLEST SERPL-MCNC: 267 MG/DL
CO2 SERPL-SCNC: 25 MMOL/L (ref 21–32)
CREAT SERPL-MCNC: 0.93 MG/DL (ref 0.6–1.3)
D DIMER PPP FEU-MCNC: 0.37 UG/ML FEU
DOP CALC LVOT PEAK VEL VTI: 18.9 CM
DOP CALC LVOT PEAK VEL: 0.9 M/S
E WAVE DECELERATION TIME: 147 MS
E/A RATIO: 1.38
ERYTHROCYTE [DISTWIDTH] IN BLOOD BY AUTOMATED COUNT: 12.8 % (ref 11.6–15.1)
EST. AVERAGE GLUCOSE BLD GHB EST-MCNC: 143 MG/DL
FRACTIONAL SHORTENING: 35 (ref 28–44)
GFR SERPL CREATININE-BSD FRML MDRD: 88 ML/MIN/1.73SQ M
GLUCOSE SERPL-MCNC: 125 MG/DL (ref 65–140)
HBA1C MFR BLD: 6.6 %
HCT VFR BLD AUTO: 41.5 % (ref 36.5–49.3)
HDLC SERPL-MCNC: 47 MG/DL
HGB BLD-MCNC: 13.9 G/DL (ref 12–17)
INTERVENTRICULAR SEPTUM IN DIASTOLE (PARASTERNAL SHORT AXIS VIEW): 1.2 CM
INTERVENTRICULAR SEPTUM: 1.2 CM (ref 0.6–1.1)
KCT BLD-ACNC: 264 SEC (ref 89–137)
LA/AORTA RATIO 2D: 1.03
LAAS-AP2: 22.6 CM2
LAAS-AP4: 16.6 CM2
LDLC SERPL CALC-MCNC: 175 MG/DL (ref 0–100)
LEFT ATRIUM SIZE: 3.5 CM
LEFT ATRIUM VOLUME (MOD BIPLANE): 55 ML
LEFT ATRIUM VOLUME INDEX (MOD BIPLANE): 25.5 ML/M2
LEFT INTERNAL DIMENSION IN SYSTOLE: 3 CM (ref 2.1–4)
LEFT VENTRICULAR INTERNAL DIMENSION IN DIASTOLE: 4.6 CM (ref 3.5–6)
LEFT VENTRICULAR POSTERIOR WALL IN END DIASTOLE: 1.1 CM
LEFT VENTRICULAR STROKE VOLUME: 63 ML
LVSV (TEICH): 63 ML
MCH RBC QN AUTO: 28.4 PG (ref 26.8–34.3)
MCHC RBC AUTO-ENTMCNC: 33.5 G/DL (ref 31.4–37.4)
MCV RBC AUTO: 85 FL (ref 82–98)
MV E'TISSUE VEL-SEP: 8 CM/S
MV PEAK A VEL: 0.6 M/S
MV PEAK E VEL: 83 CM/S
MV STENOSIS PRESSURE HALF TIME: 43 MS
MV VALVE AREA P 1/2 METHOD: 5.12
PLATELET # BLD AUTO: 275 THOUSANDS/UL (ref 149–390)
PMV BLD AUTO: 9.1 FL (ref 8.9–12.7)
POTASSIUM SERPL-SCNC: 4.2 MMOL/L (ref 3.5–5.3)
RBC # BLD AUTO: 4.89 MILLION/UL (ref 3.88–5.62)
RIGHT VENTRICLE ID DIMENSION: 3.4 CM
SL CV LV EF: 55
SL CV PED ECHO LEFT VENTRICLE DIASTOLIC VOLUME (MOD BIPLANE) 2D: 97 ML
SL CV PED ECHO LEFT VENTRICLE SYSTOLIC VOLUME (MOD BIPLANE) 2D: 34 ML
SODIUM SERPL-SCNC: 138 MMOL/L (ref 135–147)
SPECIMEN SOURCE: ABNORMAL
TRICUSPID ANNULAR PLANE SYSTOLIC EXCURSION: 2.4 CM
TRIGL SERPL-MCNC: 224 MG/DL
WBC # BLD AUTO: 11.06 THOUSAND/UL (ref 4.31–10.16)

## 2024-01-05 PROCEDURE — 99152 MOD SED SAME PHYS/QHP 5/>YRS: CPT | Performed by: INTERNAL MEDICINE

## 2024-01-05 PROCEDURE — 80048 BASIC METABOLIC PNL TOTAL CA: CPT | Performed by: NURSE PRACTITIONER

## 2024-01-05 PROCEDURE — 99153 MOD SED SAME PHYS/QHP EA: CPT | Performed by: INTERNAL MEDICINE

## 2024-01-05 PROCEDURE — 84484 ASSAY OF TROPONIN QUANT: CPT

## 2024-01-05 PROCEDURE — 85027 COMPLETE CBC AUTOMATED: CPT | Performed by: NURSE PRACTITIONER

## 2024-01-05 PROCEDURE — 93306 TTE W/DOPPLER COMPLETE: CPT

## 2024-01-05 PROCEDURE — C1769 GUIDE WIRE: HCPCS | Performed by: INTERNAL MEDICINE

## 2024-01-05 PROCEDURE — C1887 CATHETER, GUIDING: HCPCS | Performed by: INTERNAL MEDICINE

## 2024-01-05 PROCEDURE — 93458 L HRT ARTERY/VENTRICLE ANGIO: CPT | Performed by: INTERNAL MEDICINE

## 2024-01-05 PROCEDURE — C1874 STENT, COATED/COV W/DEL SYS: HCPCS | Performed by: INTERNAL MEDICINE

## 2024-01-05 PROCEDURE — 85347 COAGULATION TIME ACTIVATED: CPT

## 2024-01-05 PROCEDURE — C1725 CATH, TRANSLUMIN NON-LASER: HCPCS | Performed by: INTERNAL MEDICINE

## 2024-01-05 PROCEDURE — C1894 INTRO/SHEATH, NON-LASER: HCPCS | Performed by: INTERNAL MEDICINE

## 2024-01-05 PROCEDURE — 85730 THROMBOPLASTIN TIME PARTIAL: CPT | Performed by: INTERNAL MEDICINE

## 2024-01-05 PROCEDURE — 027135Z DILATION OF CORONARY ARTERY, TWO ARTERIES WITH TWO DRUG-ELUTING INTRALUMINAL DEVICES, PERCUTANEOUS APPROACH: ICD-10-PCS | Performed by: INTERNAL MEDICINE

## 2024-01-05 PROCEDURE — 85379 FIBRIN DEGRADATION QUANT: CPT

## 2024-01-05 PROCEDURE — 99255 IP/OBS CONSLTJ NEW/EST HI 80: CPT | Performed by: INTERNAL MEDICINE

## 2024-01-05 PROCEDURE — 99233 SBSQ HOSP IP/OBS HIGH 50: CPT | Performed by: INTERNAL MEDICINE

## 2024-01-05 PROCEDURE — 92928 PRQ TCAT PLMT NTRAC ST 1 LES: CPT | Performed by: INTERNAL MEDICINE

## 2024-01-05 PROCEDURE — B2111ZZ FLUOROSCOPY OF MULTIPLE CORONARY ARTERIES USING LOW OSMOLAR CONTRAST: ICD-10-PCS | Performed by: INTERNAL MEDICINE

## 2024-01-05 PROCEDURE — 85730 THROMBOPLASTIN TIME PARTIAL: CPT | Performed by: FAMILY MEDICINE

## 2024-01-05 PROCEDURE — 84484 ASSAY OF TROPONIN QUANT: CPT | Performed by: FAMILY MEDICINE

## 2024-01-05 PROCEDURE — 80061 LIPID PANEL: CPT | Performed by: NURSE PRACTITIONER

## 2024-01-05 PROCEDURE — 93306 TTE W/DOPPLER COMPLETE: CPT | Performed by: INTERNAL MEDICINE

## 2024-01-05 PROCEDURE — 4A023N7 MEASUREMENT OF CARDIAC SAMPLING AND PRESSURE, LEFT HEART, PERCUTANEOUS APPROACH: ICD-10-PCS | Performed by: INTERNAL MEDICINE

## 2024-01-05 PROCEDURE — 83036 HEMOGLOBIN GLYCOSYLATED A1C: CPT | Performed by: NURSE PRACTITIONER

## 2024-01-05 PROCEDURE — 92941 PRQ TRLML REVSC TOT OCCL AMI: CPT | Performed by: INTERNAL MEDICINE

## 2024-01-05 DEVICE — STENT ONYXNG35012UX ONYX 3.50X12RX
Type: IMPLANTABLE DEVICE | Status: FUNCTIONAL
Brand: ONYX FRONTIER™

## 2024-01-05 RX ORDER — MIDAZOLAM HYDROCHLORIDE 2 MG/2ML
INJECTION, SOLUTION INTRAMUSCULAR; INTRAVENOUS CODE/TRAUMA/SEDATION MEDICATION
Status: DISCONTINUED | OUTPATIENT
Start: 2024-01-05 | End: 2024-01-05 | Stop reason: HOSPADM

## 2024-01-05 RX ORDER — FENTANYL CITRATE 50 UG/ML
INJECTION, SOLUTION INTRAMUSCULAR; INTRAVENOUS CODE/TRAUMA/SEDATION MEDICATION
Status: DISCONTINUED | OUTPATIENT
Start: 2024-01-05 | End: 2024-01-05 | Stop reason: HOSPADM

## 2024-01-05 RX ORDER — METOCLOPRAMIDE HYDROCHLORIDE 5 MG/ML
10 INJECTION INTRAMUSCULAR; INTRAVENOUS ONCE
Status: COMPLETED | OUTPATIENT
Start: 2024-01-05 | End: 2024-01-05

## 2024-01-05 RX ORDER — TRAZODONE HYDROCHLORIDE 50 MG/1
25 TABLET ORAL ONCE
Status: COMPLETED | OUTPATIENT
Start: 2024-01-05 | End: 2024-01-05

## 2024-01-05 RX ORDER — ONDANSETRON 2 MG/ML
4 INJECTION INTRAMUSCULAR; INTRAVENOUS EVERY 6 HOURS PRN
Status: DISCONTINUED | OUTPATIENT
Start: 2024-01-05 | End: 2024-01-06 | Stop reason: HOSPADM

## 2024-01-05 RX ORDER — NITROGLYCERIN 0.4 MG/1
0.4 TABLET SUBLINGUAL
Status: DISCONTINUED | OUTPATIENT
Start: 2024-01-05 | End: 2024-01-06 | Stop reason: HOSPADM

## 2024-01-05 RX ORDER — LIDOCAINE 50 MG/G
1 PATCH TOPICAL DAILY
Status: DISCONTINUED | OUTPATIENT
Start: 2024-01-05 | End: 2024-01-06 | Stop reason: HOSPADM

## 2024-01-05 RX ORDER — LABETALOL HYDROCHLORIDE 5 MG/ML
10 INJECTION, SOLUTION INTRAVENOUS EVERY 6 HOURS PRN
Status: DISCONTINUED | OUTPATIENT
Start: 2024-01-05 | End: 2024-01-06 | Stop reason: HOSPADM

## 2024-01-05 RX ORDER — LIDOCAINE WITH 8.4% SOD BICARB 0.9%(10ML)
SYRINGE (ML) INJECTION CODE/TRAUMA/SEDATION MEDICATION
Status: DISCONTINUED | OUTPATIENT
Start: 2024-01-05 | End: 2024-01-05 | Stop reason: HOSPADM

## 2024-01-05 RX ORDER — ATORVASTATIN CALCIUM 40 MG/1
80 TABLET, FILM COATED ORAL
Status: DISCONTINUED | OUTPATIENT
Start: 2024-01-05 | End: 2024-01-06 | Stop reason: HOSPADM

## 2024-01-05 RX ORDER — VERAPAMIL HCL 2.5 MG/ML
AMPUL (ML) INTRAVENOUS CODE/TRAUMA/SEDATION MEDICATION
Status: DISCONTINUED | OUTPATIENT
Start: 2024-01-05 | End: 2024-01-05 | Stop reason: HOSPADM

## 2024-01-05 RX ORDER — HEPARIN SODIUM 1000 [USP'U]/ML
INJECTION, SOLUTION INTRAVENOUS; SUBCUTANEOUS CODE/TRAUMA/SEDATION MEDICATION
Status: DISCONTINUED | OUTPATIENT
Start: 2024-01-05 | End: 2024-01-05 | Stop reason: HOSPADM

## 2024-01-05 RX ORDER — PROMETHAZINE HYDROCHLORIDE 6.25 MG/5ML
12.5 SYRUP ORAL ONCE
Status: DISCONTINUED | OUTPATIENT
Start: 2024-01-05 | End: 2024-01-05

## 2024-01-05 RX ORDER — HYDROMORPHONE HCL/PF 1 MG/ML
0.5 SYRINGE (ML) INJECTION EVERY 6 HOURS PRN
Status: DISCONTINUED | OUTPATIENT
Start: 2024-01-05 | End: 2024-01-06 | Stop reason: HOSPADM

## 2024-01-05 RX ORDER — HYDRALAZINE HYDROCHLORIDE 20 MG/ML
10 INJECTION INTRAMUSCULAR; INTRAVENOUS EVERY 6 HOURS PRN
Status: DISCONTINUED | OUTPATIENT
Start: 2024-01-05 | End: 2024-01-06 | Stop reason: HOSPADM

## 2024-01-05 RX ORDER — ATORVASTATIN CALCIUM 40 MG/1
40 TABLET, FILM COATED ORAL
Status: DISCONTINUED | OUTPATIENT
Start: 2024-01-05 | End: 2024-01-05

## 2024-01-05 RX ADMIN — HYDROMORPHONE HYDROCHLORIDE 0.5 MG: 1 INJECTION, SOLUTION INTRAMUSCULAR; INTRAVENOUS; SUBCUTANEOUS at 09:01

## 2024-01-05 RX ADMIN — TRAZODONE HYDROCHLORIDE 25 MG: 50 TABLET ORAL at 01:09

## 2024-01-05 RX ADMIN — METOCLOPRAMIDE 10 MG: 5 INJECTION, SOLUTION INTRAMUSCULAR; INTRAVENOUS at 20:29

## 2024-01-05 RX ADMIN — ASPIRIN 81 MG: 81 TABLET, CHEWABLE ORAL at 09:00

## 2024-01-05 RX ADMIN — ACETAMINOPHEN 650 MG: 325 TABLET, FILM COATED ORAL at 14:22

## 2024-01-05 RX ADMIN — HYDRALAZINE HYDROCHLORIDE 10 MG: 20 INJECTION INTRAMUSCULAR; INTRAVENOUS at 18:58

## 2024-01-05 RX ADMIN — HYDROMORPHONE HYDROCHLORIDE 0.5 MG: 1 INJECTION, SOLUTION INTRAMUSCULAR; INTRAVENOUS; SUBCUTANEOUS at 20:30

## 2024-01-05 RX ADMIN — ONDANSETRON 4 MG: 2 INJECTION INTRAMUSCULAR; INTRAVENOUS at 18:55

## 2024-01-05 RX ADMIN — HEPARIN SODIUM 15.1 UNITS/KG/HR: 10000 INJECTION, SOLUTION INTRAVENOUS at 13:50

## 2024-01-05 RX ADMIN — ACETAMINOPHEN 650 MG: 325 TABLET, FILM COATED ORAL at 01:09

## 2024-01-05 RX ADMIN — LIDOCAINE 1 PATCH: 50 PATCH TOPICAL at 05:04

## 2024-01-05 RX ADMIN — PANTOPRAZOLE SODIUM 40 MG: 40 TABLET, DELAYED RELEASE ORAL at 05:04

## 2024-01-05 NOTE — CASE MANAGEMENT
Case Management Progress Note    Patient name Jaime Coronado MO CATH LAB VIR/MO CATH * MRN [id number]  : 1965 Date 2024       LOS (days): 1  Geometric Mean LOS (GMLOS) (days):   Days to GMLOS:        OBJECTIVE:  Current admission status: Inpatient  Preferred Pharmacy:   [id number] [address] [address]  Phone: [phone] Fax: [phone]    Primary Care Provider: No primary care provider on file.  Primary Insurance:   Secondary Insurance:     PROGRESS NOTE:  Patient reviewed during Interdisciplinary Rounds with ANKIT today.  Anticipated d/c is TBD.  CM assessment pending.  Patient currently in cath lab.  [facility] is 24.  No anticipated CM needs.  Patient is listed as self-pay, may need PATHS referral.  CM will continue to follow for d/c planning and needs.

## 2024-01-05 NOTE — ASSESSMENT & PLAN NOTE
Present on admission with heart rates ranging between 40-50  Continue to monitor on telemetry  Atenolol has been discontinued.  Follow-up with Cardiology consult.

## 2024-01-05 NOTE — UTILIZATION REVIEW
Initial Clinical Review    Admission: Date/Time/Statement:   Admission Orders (From admission, onward)       Ordered        01/04/24 1527  INPATIENT ADMISSION  Once                          Orders Placed This Encounter   Procedures    INPATIENT ADMISSION     Standing Status:   Standing     Number of Occurrences:   1     Order Specific Question:   Level of Care     Answer:   Med Surg [16]     Order Specific Question:   Estimated length of stay     Answer:   More than 2 Midnights     Order Specific Question:   Certification     Answer:   I certify that inpatient services are medically necessary for this patient for a duration of greater than two midnights. See H&P and MD Progress Notes for additional information about the patient's course of treatment.     ED Arrival Information       Expected   -    Arrival   1/4/2024 12:26    Acuity   Emergent              Means of arrival   Walk-In    Escorted by   Family Member    Service   Hospitalist    Admission type   Emergency              Arrival complaint   chest pain & pressure             Chief Complaint   Patient presents with    Chest Pain     Patient c/o chest pain that started this morning. Patient c/o right arm heaviness.       Initial Presentation: 58 y.o. male to the ED from home with complaints of chest pain, right arm heaviness which started 2 hours prior to arrival. Admitted to inpatient for chest pain, bradycardia, hypertensive urgency. H/O htn, Arrives with elevated BP. Initial troponin 8,  with increase to 55 at 2 hour delta. Started on IV heparin drip.  Heart rate 40-50s, intermittent dizziness.  Cardiology consult.     Date: 1/5   Day 2:  Continue with heparin drip.  Complains of heavy breathing.   Remains NPO for likely cardiac cath.     ED Triage Vitals   Temperature Pulse Respirations Blood Pressure SpO2   01/04/24 1234 01/04/24 1234 01/04/24 1234 01/04/24 1234 01/04/24 1234   (!) 97.4 °F (36.3 °C) 65 20 (!) 186/120 97 %      Temp Source Heart Rate  Source Patient Position - Orthostatic VS BP Location FiO2 (%)   01/04/24 1234 01/04/24 1234 01/04/24 1234 01/04/24 1234 --   Temporal Monitor Sitting Left arm       Pain Score       01/04/24 1303       9          Wt Readings from Last 1 Encounters:   01/05/24 95.7 kg (210 lb 15.7 oz)     Additional Vital Signs: Vital Signs (last 2 days)    Date/Time Temp Pulse Resp BP MAP (mmHg) SpO2 Calculated FIO2 (%) - Nasal Cannula Nasal Cannula O2 Flow Rate (L/min) O2 Device Patient Position - Orthostatic VS   01/05/24 0901 -- -- -- -- -- -- 28 2 L/min Nasal cannula --   01/05/24 06:26:20 98.1 °F (36.7 °C) 58 18 133/74 94 96 % -- -- -- --   01/05/24 02:09:58 97.6 °F (36.4 °C) 53 Abnormal  18 140/85 103 95 % -- -- -- --   01/05/24 01:04:23 97.7 °F (36.5 °C) 58 18 138/87 104 95 % -- -- -- --   01/04/24 21:15:08 97.6 °F (36.4 °C) 61 18 137/87 104 95 % -- -- -- --   01/04/24 2000 -- 55 19 164/95 123 96 % -- -- -- --   01/04/24 1915 -- 56 19 147/90 112 96 % -- -- None (Room air) Lying   01/04/24 1900 -- 54 Abnormal  21 148/87 112 95 % -- -- None (Room air) Lying   01/04/24 1511 -- 56 20 140/76 -- 92 % -- -- None (Room air) Lying   01/04/24 1400 -- 59 23 Abnormal  198/99 Abnormal  -- 96 % -- -- None (Room air) Sitting   01/04/24 1331 -- -- -- -- -- -- -- -- None (Room air) --   01/04/24 1330 -- 61 15 168/102 Abnormal  136 96 % -- -- None (Room air) Sitting   01/04/24 1300 -- 61 21 163/108 Abnormal  131 98 % -- -- None (Room air) Sitting   01/04/24 1246 -- 64 22 171/104 Abnormal  -- 98 % -- -- None (Room air) Sitting   01/04/24 1234 97.4 °F (36.3 °C) Abnormal  65 20 186/120 Abnormal  147 97 % -- -- None (Room air) Sitting     Pertinent Labs/Diagnostic Test Results:   1/4 EKG: Narrative & Impression    Normal sinus rhythm  Moderate voltage criteria for LVH, may be normal variant  Borderline ECG  No previous ECGs available     CT spine cervical wo contrast   Final Result by Orestes King MD (01/05 0803)      Multilevel cervical spondylosis,  as described above.      Severe right foraminal narrowing is noted at C5-C6.            Workstation performed: [id number]         CTA dissection protocol chest/abdomen/pelvis   Final Result by Martir Hdz MD (01/04 1400)      1.  No acute aortic, thoracic or abdominopelvic pathology.   2.  Hepatic steatosis.               Workstation performed: [id number]         XR chest 1 view portable   Final Result by Martir Hdz MD (01/04 1400)      No acute cardiopulmonary disease.                  Workstation performed: [id number]              Results from last 7 days   Lab Units 01/04/24  1301   SARS-COV-2  Negative     Results from last 7 days   Lab Units 01/04/24  1239   WBC Thousand/uL 10.46*   HEMOGLOBIN g/dL 15.1   HEMATOCRIT % 44.6   PLATELETS Thousands/uL 320   NEUTROS ABS Thousands/µL 4.61         Results from last 7 days   Lab Units 01/04/24  1239   SODIUM mmol/L 136   POTASSIUM mmol/L 4.0   CHLORIDE mmol/L 103   CO2 mmol/L 24   ANION GAP mmol/L 9   BUN mg/dL 18   CREATININE mg/dL 1.00   EGFR ml/min/1.73sq m 81   CALCIUM mg/dL 9.7     Results from last 7 days   Lab Units 01/04/24  1239   AST U/L 23   ALT U/L 42   ALK PHOS U/L 91   TOTAL PROTEIN g/dL 7.7   ALBUMIN g/dL 5.0   TOTAL BILIRUBIN mg/dL 0.45         Results from last 7 days   Lab Units 01/04/24  1239   GLUCOSE RANDOM mg/dL 103         Results from last 7 days   Lab Units 01/05/24  0441   HEMOGLOBIN A1C % 6.6*   EAG mg/dl 143       Results from last 7 days   Lab Units 01/05/24  0624 01/05/24  0441 01/04/24  1742 01/04/24  1438 01/04/24  1239   HS TNI 0HR ng/L  --  7,226*  --   --  8   HS TNI 2HR ng/L 10,117*  --   --  55*  --    HSTNI D2 ng/L 2,891*  --   --  47*  --    HS TNI 4HR ng/L  --   --  267*  --   --    HSTNI D4 ng/L  --   --  259*  --   --      Results from last 7 days   Lab Units 01/05/24  0207   D-DIMER QUANTITATIVE ug/ml FEU 0.37     Results from last 7 days   Lab Units 01/05/24  0441 01/04/24  2154 01/04/24  1534   PROTIME seconds   --   --  12.9   INR   --   --  0.92   PTT seconds 80* 39* 26         Results from last 7 days   Lab Units 01/04/24  1301   INFLUENZA A PCR  Negative   INFLUENZA B PCR  Negative   RSV PCR  Negative     ED Treatment:   Medication Administration from 01/04/2024 1226 to 01/04/2024 2040         Date/Time Order Dose Route Action Comments     01/04/2024 1243 EST nitroglycerin (NITROSTAT) SL tablet 0.4 mg 0.4 mg Sublingual Given --     01/04/2024 1244 EST aspirin chewable tablet 324 mg 324 mg Oral Given --     01/04/2024 1303 EST morphine injection 6 mg 6 mg Intravenous Given --     01/04/2024 1321 EST nitroglycerin (NITRO-BID) 2 % TD ointment 1 inch 1 inch Topical Given --     01/04/2024 1414 EST HYDROmorphone (DILAUDID) injection 1 mg 1 mg Intravenous Given --     01/04/2024 1542 EST heparin (porcine) injection 4,000 Units 4,000 Units Intravenous Given --     01/04/2024 1543 EST heparin (porcine) 25,000 units in 0.45% NaCl 250 mL infusion (premix) 11.1 Units/kg/hr Intravenous New Bag --          Past Medical History:   Diagnosis Date    Hypertension     Reflux gastritis            Admitting Diagnosis: Bradycardia, sinus [R00.1]  Chest pain [R07.9]  Elevated troponin [R79.89]  NSTEMI (non-ST elevated myocardial infarction) (HCC) [I21.4]  Chest pain, unspecified type [R07.9]  Age/Sex: 58 y.o. male  Admission Orders:  Tele  Scheduled Medications:  aspirin, 81 mg, Oral, Daily  atorvastatin, 80 mg, Oral, Daily With Dinner  citalopram, 20 mg, Oral, Daily  lidocaine, 1 patch, Topical, Daily  pantoprazole, 40 mg, Oral, Early Morning      Continuous IV Infusions:  heparin (porcine), 3-20 Units/kg/hr (Order-Specific), Intravenous, Titrated      PRN Meds:  acetaminophen, 650 mg, Oral, Q6H PRN  heparin (porcine), 2,000 Units, Intravenous, Q6H PRN  heparin (porcine), 4,000 Units, Intravenous, Q6H PRN  HYDROmorphone, 0.5 mg, Intravenous, Q6H PRN  nitroglycerin, 0.4 mg, Sublingual, Q5 Min PRN        IP CONSULT TO CARDIOLOGY    Network  Utilization Review Department  ATTENTION: Please call with any questions or concerns to [phone] and carefully listen to the prompts so that you are directed to the right person. All voicemails are confidential.   For Discharge needs, contact Care Management DC Support Team at [phone] opt. [phone]  Send all requests for admission clinical reviews, approved or denied determinations and any other requests to dedicated fax number below belonging to the campus where the patient is receiving treatment. List of dedicated fax numbers for the Facilities:  FACILITY NAME UR FAX NUMBER   ADMISSION DENIALS (Administrative/Medical Necessity) [phone]   DISCHARGE SUPPORT TEAM (NETWORK) [phone]   PARENT CHILD HEALTH (Maternity/NICU/Pediatrics) [phone]   General acute hospital [phone]   [facility] [phone]   [facility] [phone]   [facility] [phone]   [facility] [phone]   [facility] [phone]   [facility] [phone]   [facility] [phone]   [facility] [phone]   [facility] [phone]   [facility] [phone]

## 2024-01-05 NOTE — PROGRESS NOTES
[facility]  Progress Note  Name: Jaime Noble I  MRN: [id number]  Unit/Bed#: -01 I Date of Admission: 1/4/2024   Date of Service: 1/5/2024 I Hospital Day: 1    Assessment/Plan   * Chest pain, unspecified  Assessment & Plan  Patient presented to the ED with complaints of sudden onset of chest pain with nausea/diaphoresis for 2 hours prior to coming into the hospital.  No records to review, EKG with NSR.  Monitor on telemetry  Cardiology consulted, appreciate input  Continue NPO status in setting of elevated troponin levels that continue to rise.  Continue with Heparin drip.    Cervical spondylolysis  Assessment & Plan  Known history of cervical spine abnormality per patient  CT Cervical Spine (1/3/24): Multilevel cervical spondylosis, as described above. Severe right foraminal narrowing is noted at C5-C6.  Follow up with neurosurgery OP    Mixed hyperlipidemia  Assessment & Plan  Lipid panel collected today  Total chol 267, Triglycerides 224,   Continue with statin    Bradycardia, sinus  Assessment & Plan  Present on admission with heart rates ranging between 40-50  Continue to monitor on telemetry  Atenolol has been discontinued.  Follow-up with Cardiology consult.    Hypertensive urgency  Assessment & Plan  Present on admission, evidenced by blood pressures ranging from 163-198/  Patient does report history of hypertension and reports that he is taking 25 mg Atenolol daily at home.  Hypertension has resolved, 133/74  Continue to monitor vital signs.    Elevated troponin  Assessment & Plan  Initial trop on admission was 8, and following troponin levels continued to rise. Troponin this morning was noted to be 10,117  Continue on Heparin drip  Cardiology consult placed  SL Nitro as needed for chest pain  NPO at this time for likely cardiac cath    Leukocytosis  Assessment & Plan  Present on admission with a leukocytosis of 10.46  Not meeting SIRS criteria  Likely reactive,  "no sign of active infection  Will continue to monitor           VTE Pharmacologic Prophylaxis: VTE Score: 3 Moderate Risk (Score 3-4) - Pharmacological DVT Prophylaxis Ordered: heparin drip.    Mobility:   Basic Mobility Inpatient Raw Score: 24  JH-HLM Goal: 8: Walk 250 feet or more  JH-HLM Achieved: 3: Sit at edge of bed  HLM Goal achieved. Continue to encourage appropriate mobility.    Patient Centered Rounds: I performed bedside rounds with nursing staff today.   Discussions with Specialists or Other Care Team Provider: Case Management, Cardiology    Education and Discussions with Family / Patient: Patient declined call to .     Total Time Spent on Date of Encounter in care of patient: 48 mins. This time was spent on one or more of the following: performing physical exam; counseling and coordination of care; obtaining or reviewing history; documenting in the medical record; reviewing/ordering tests, medications or procedures; communicating with other healthcare professionals and discussing with patient's family/caregivers.    Current Length of Stay: 1 day(s)  Current Patient Status: Inpatient   Certification Statement: The patient will continue to require additional inpatient hospital stay due to ongoing treatment in setting of need for cardiac cath today in the setting of elevated troponin levels.  Discharge Plan: Anticipate discharge in 24-48 hrs to home.    Code Status: Level 1 - Full Code    Subjective:   Patient resting in bed, denies any complaints of chest pain, fever or chills.  Does report some heavy \"breathing\".  No nausea/vomiting.    Objective:     Vitals:   Temp (24hrs), Av.7 °F (36.5 °C), Min:97.4 °F (36.3 °C), Max:98.1 °F (36.7 °C)    Temp:  [97.4 °F (36.3 °C)-98.1 °F (36.7 °C)] 98.1 °F (36.7 °C)  HR:  [53-65] 56  Resp:  [15-23] 18  BP: (133-198)/() 152/94  SpO2:  [92 %-98 %] 93 %  Body mass index is 29.29 kg/m².     Input and Output Summary (last 24 hours): "     Intake/Output Summary (Last 24 hours) at 1/5/2024 1145  Last data filed at 1/5/2024 1032  Gross per 24 hour   Intake 0 ml   Output 575 ml   Net -575 ml       Physical Exam:   Physical Exam  Vitals and nursing note reviewed.   Constitutional:       General: He is not in acute distress.     Appearance: He is ill-appearing.   Cardiovascular:      Rate and Rhythm: Normal rate.      Pulses: Normal pulses.      Heart sounds: Normal heart sounds.   Pulmonary:      Effort: Pulmonary effort is normal.      Breath sounds: Normal breath sounds.   Abdominal:      General: Bowel sounds are normal.      Palpations: Abdomen is soft.   Musculoskeletal:         General: Normal range of motion.      Right lower leg: No edema.      Left lower leg: No edema.   Skin:     General: Skin is warm and dry.   Neurological:      Mental Status: He is alert and oriented to person, place, and time.   Psychiatric:         Mood and Affect: Mood normal.          Additional Data:     Labs:  Results from last 7 days   Lab Units 01/05/24  0911 01/04/24  1239   WBC Thousand/uL 11.06* 10.46*   HEMOGLOBIN g/dL 13.9 15.1   HEMATOCRIT % 41.5 44.6   PLATELETS Thousands/uL 275 320   NEUTROS PCT %  --  44   LYMPHS PCT %  --  42   MONOS PCT %  --  11   EOS PCT %  --  2     Results from last 7 days   Lab Units 01/05/24  0911 01/04/24  1239   SODIUM mmol/L 138 136   POTASSIUM mmol/L 4.2 4.0   CHLORIDE mmol/L 105 103   CO2 mmol/L 25 24   BUN mg/dL 19 18   CREATININE mg/dL 0.93 1.00   ANION GAP mmol/L 8 9   CALCIUM mg/dL 9.3 9.7   ALBUMIN g/dL  --  5.0   TOTAL BILIRUBIN mg/dL  --  0.45   ALK PHOS U/L  --  91   ALT U/L  --  42   AST U/L  --  23   GLUCOSE RANDOM mg/dL 125 103     Results from last 7 days   Lab Units 01/04/24  1534   INR  0.92         Results from last 7 days   Lab Units 01/05/24  0441   HEMOGLOBIN A1C % 6.6*           Lines/Drains:  Invasive Devices       Peripheral Intravenous Line  Duration             Peripheral IV 01/04/24 Dorsal  (posterior);Right Hand <1 day    Peripheral IV 01/04/24 Left;Dorsal (posterior) Forearm <1 day                    Telemetry:  Telemetry Orders (From admission, onward)               24 Hour Telemetry Monitoring  (ED Bridging Orders Panel)  Continuous x 24 Hours (Telem)        Question:  Reason for 24 Hour Telemetry  Answer:  PCI/EP study (including pacer and ICD implementation), Cardiac surgery, MI, abnormal cardiac cath, and chest pain- rule out MI                     Telemetry Reviewed: Normal Sinus Rhythm  Indication for Continued Telemetry Use: Acute MI/Unstable Angina/Rule out ACS             Imaging: No pertinent imaging reviewed.    Recent Cultures (last 7 days):         Last 24 Hours Medication List:   Current Facility-Administered Medications   Medication Dose Route Frequency Provider Last Rate    acetaminophen  650 mg Oral Q6H PRN TANA Pierce      aspirin  81 mg Oral Daily TANA Pierce      atorvastatin  80 mg Oral Daily With Dinner TANA Fernandez      citalopram  20 mg Oral Daily TANA Pierce      heparin (porcine)  3-20 Units/kg/hr (Order-Specific) Intravenous Titrated Adolph aCrreno MD 15.1 Units/kg/hr (01/04/24 2229)    heparin (porcine)  2,000 Units Intravenous Q6H PRN Adolph Carreno MD      heparin (porcine)  4,000 Units Intravenous Q6H PRN Adolph Carreno MD      HYDROmorphone  0.5 mg Intravenous Q6H PRN TANA Matthews      lidocaine  1 patch Topical Daily TANA Matthews      nitroglycerin  0.4 mg Sublingual Q5 Min PRN TANA Matthews      pantoprazole  40 mg Oral Early Morning TANA Pierce          Today, Patient Was Seen By: TANA Pierce    **Please Note: This note may have been constructed using a voice recognition system.**

## 2024-01-05 NOTE — ASSESSMENT & PLAN NOTE
Initial trop on admission was 8, and following troponin levels continued to rise. Troponin this morning was noted to be 10,117  Continue on Heparin drip  Cardiology consult placed  SL Nitro as needed for chest pain  NPO at this time for likely cardiac cath

## 2024-01-05 NOTE — PROGRESS NOTES
[facility]  Progress Note  Name: Jaime Noble I  MRN: [id number]  Unit/Bed#: -01 I Date of Admission: 1/4/2024   Date of Service: 1/5/2024 I Hospital Day: 1    Assessment/Plan   * Chest pain, unspecified  Assessment & Plan  Patient presented to the ER after sudden onset of chest pain associated with nausea, diaphoresis, and right shoulder pain.  Symptoms resolved spontaneously prior to arrival, but still had persistent right shoulder pain.  In the emergency room EKG was without ischemic changes however he had elevated troponin was admitted to the hospital service.  Troponins have continued to elevate currently at 13,000  Echo: 55%, no diastolic dysfunction, no wall motion abnormalities     Will need cardiac catheterization for further ischemic evaluation  Continue to monitor on telemetry  Continue aspirin and statin  Continue on heparin drip    Cervical spondylolysis  Assessment & Plan  Known history of cervical spine abnormality per patient  CT Cervical Spine (1/3/24): Multilevel cervical spondylosis, as described above. Severe right foraminal narrowing is noted at C5-C6.  Could be contributory to the patient's right shoulder and bicep pain.  Follow-up CT of the right shoulder  Would benefit from outpatient neurosurgery evaluation    Mixed hyperlipidemia  Assessment & Plan  Lipid panel collected today  Total chol 267, Triglycerides 224,   Continue with statin    Bradycardia, sinus  Assessment & Plan  Present on admission with heart rates ranging between 40-60  Continue to monitor on telemetry  Atenolol has been discontinued.  Appreciate cardiology recommendations    Hypertensive urgency  Assessment & Plan  Resolved  Consider initiation of antihypertensive agent following cardiac catheterization    Elevated troponin  Assessment & Plan  Markedly elevated troponin; will go for ischemic evaluation via cardiac catheterization later today    Leukocytosis-resolved as of  2024  Assessment & Plan  Present on admission with a leukocytosis of 10.46  Not meeting SIRS criteria  Likely reactive, no sign of active infection  Will continue to monitor        VTE Pharmacologic Prophylaxis: VTE Score: 3 Moderate Risk (Score 3-4) - Pharmacological DVT Prophylaxis Ordered: heparin drip.    Mobility:   Basic Mobility Inpatient Raw Score: 24  JH-HLM Goal: 8: Walk 250 feet or more  JH-HLM Achieved: 3: Sit at edge of bed  HLM Goal NOT achieved. Continue with multidisciplinary rounding and encourage appropriate mobility to improve upon HLM goals.    Patient Centered Rounds: I performed bedside rounds with nursing staff today.   Discussions with Specialists or Other Care Team Provider: RENATA. Cardiology.     Education and Discussions with Family / Patient: Updated  (son) via phone.    Total Time Spent on Date of Encounter in care of patient: 45 mins. This time was spent on one or more of the following: performing physical exam; counseling and coordination of care; obtaining or reviewing history; documenting in the medical record; reviewing/ordering tests, medications or procedures; communicating with other healthcare professionals and discussing with patient's family/caregivers.    Current Length of Stay: 1 day(s)  Current Patient Status: Inpatient   Certification Statement: The patient will continue to require additional inpatient hospital stay due to heparin drip, cardiac evaluation, medication titration, cardiac monitoring, serial lab monitoring  Discharge Plan: Anticipate discharge in 24-48 hrs to home.    Code Status: Level 1 - Full Code    Subjective:   Patient seen and examined.  Still having right shoulder pain.  Has not had any more of the chest pressure.  Pleasant and conversive during my evaluation in no distress.  Awaiting cardiac catheterization.    Objective:     Vitals:   Temp (24hrs), Av.9 °F (36.6 °C), Min:97.6 °F (36.4 °C), Max:98.3 °F (36.8 °C)    Temp:  [97.6 °F  (36.4 °C)-98.3 °F (36.8 °C)] 98.3 °F (36.8 °C)  HR:  [53-66] 66  Resp:  [16-21] 16  BP: (133-164)/(74-97) 155/97  SpO2:  [93 %-96 %] 93 %  Body mass index is 29.29 kg/m².     Input and Output Summary (last 24 hours):     Intake/Output Summary (Last 24 hours) at 1/5/2024 1528  Last data filed at 1/5/2024 1032  Gross per 24 hour   Intake 0 ml   Output 575 ml   Net -575 ml       PHYSICAL EXAM:    Vitals signs reviewed  Constitutional   Awake and cooperative. NAD.   Head/Neck   Normocephalic. Atraumatic.   HEENT   No scleral icterus. EOMI.   Heart   Regular rate and rhythm. No murmers.   Lungs   Clear to auscultation bilaterally. Respirations unlaboured.   Abdomen   Soft. Nontender. Nondistended.    Skin   Skin color normal. No rashes.   Extremities   No deformities. No peripheral edema.   Neuro   Alert and oriented. No new deficits.   Psych   Mood stable. Affect normal.         Additional Data:     Labs:  Results from last 7 days   Lab Units 01/05/24  0911 01/04/24  1239   WBC Thousand/uL 11.06* 10.46*   HEMOGLOBIN g/dL 13.9 15.1   HEMATOCRIT % 41.5 44.6   PLATELETS Thousands/uL 275 320   NEUTROS PCT %  --  44   LYMPHS PCT %  --  42   MONOS PCT %  --  11   EOS PCT %  --  2     Results from last 7 days   Lab Units 01/05/24  0911 01/04/24  1239   SODIUM mmol/L 138 136   POTASSIUM mmol/L 4.2 4.0   CHLORIDE mmol/L 105 103   CO2 mmol/L 25 24   BUN mg/dL 19 18   CREATININE mg/dL 0.93 1.00   ANION GAP mmol/L 8 9   CALCIUM mg/dL 9.3 9.7   ALBUMIN g/dL  --  5.0   TOTAL BILIRUBIN mg/dL  --  0.45   ALK PHOS U/L  --  91   ALT U/L  --  42   AST U/L  --  23   GLUCOSE RANDOM mg/dL 125 103     Results from last 7 days   Lab Units 01/04/24  1534   INR  0.92         Results from last 7 days   Lab Units 01/05/24  0441   HEMOGLOBIN A1C % 6.6*           Lines/Drains:  Invasive Devices       Peripheral Intravenous Line  Duration             Peripheral IV 01/04/24 Dorsal (posterior);Right Hand 1 day    Peripheral IV 01/04/24 Left;Dorsal  (posterior) Forearm 1 day                      Telemetry:  Telemetry Orders (From admission, onward)               24 Hour Telemetry Monitoring  (ED Bridging Orders Panel)  Continuous x 24 Hours (Telem)        Expiring   Question:  Reason for 24 Hour Telemetry  Answer:  PCI/EP study (including pacer and ICD implementation), Cardiac surgery, MI, abnormal cardiac cath, and chest pain- rule out MI                     Telemetry Reviewed: normal sinus rhythm and sinus bradycardia  Indication for Continued Telemetry Use: Acute MI/Unstable Angina/Rule out ACS             Imaging: Reviewed radiology reports from this admission including: ECHO    Recent Cultures (last 7 days):         Last 24 Hours Medication List:   Current Facility-Administered Medications   Medication Dose Route Frequency Provider Last Rate    acetaminophen  650 mg Oral Q6H PRN TANA Pierce      aspirin  81 mg Oral Daily TANA Pierce      atorvastatin  80 mg Oral Daily With Dinner TANA Fernandez      citalopram  20 mg Oral Daily TANA Pierce      heparin (porcine)  3-20 Units/kg/hr (Order-Specific) Intravenous Titrated Adolph Carreno MD 15.1 Units/kg/hr (01/05/24 1350)    heparin (porcine)  2,000 Units Intravenous Q6H PRN Adolph Carreno MD      heparin (porcine)  4,000 Units Intravenous Q6H PRN Adolph Carreno MD      HYDROmorphone  0.5 mg Intravenous Q6H PRN TANA Matthews      lidocaine  1 patch Topical Daily TANA Matthews      nitroglycerin  0.4 mg Sublingual Q5 Min PRN TANA Matthews      pantoprazole  40 mg Oral Early Morning TANA Pierce          Today, Patient Was Seen By: Chad Mcclain DO    **Please Note: This note may have been constructed using a voice recognition system.**

## 2024-01-05 NOTE — ASSESSMENT & PLAN NOTE
Markedly elevated troponin; will go for ischemic evaluation via cardiac catheterization later today

## 2024-01-05 NOTE — ASSESSMENT & PLAN NOTE
Known history of cervical spine abnormality per patient  CT Cervical Spine (1/3/24): Multilevel cervical spondylosis, as described above. Severe right foraminal narrowing is noted at C5-C6.  Follow up with neurosurgery OP

## 2024-01-05 NOTE — ASSESSMENT & PLAN NOTE
Present on admission, evidenced by blood pressures ranging from 163-198/  Patient does report history of hypertension and reports that he is taking 25 mg Atenolol daily at home.  Hypertension has resolved, 133/74  Continue to monitor vital signs.

## 2024-01-05 NOTE — ASSESSMENT & PLAN NOTE
Present on admission with heart rates ranging between 40-60  Continue to monitor on telemetry  Atenolol has been discontinued.  Appreciate cardiology recommendations

## 2024-01-05 NOTE — ASSESSMENT & PLAN NOTE
Patient presented to the ED with complaints of sudden onset of chest pain with nausea/diaphoresis for 2 hours prior to coming into the hospital.  No records to review, EKG with NSR.  Monitor on telemetry  Cardiology consulted, appreciate input  Continue NPO status in setting of elevated troponin levels that continue to rise.  Continue with Heparin drip.

## 2024-01-05 NOTE — CONSULTS
Consultation - Cardiology Team One  Jaime Noble 58 y.o. male MRN: [id number]  Unit/Bed#: -01 Encounter: [id number]    Inpatient consult to Cardiology  Consult performed by: TANA Fernandez  Consult ordered by: TANA Pierce          Physician Requesting Consult: Chad Mcclain, *  Reason for Consult / Principal Problem: Bradycardia and elevated troponin    Assessment/Plan:    Elevated troponin  - Patient presented with chest pain, troponin initially 8 and trended up overnight to a peak of 13,394 so far  -Also with intermittent chest pain  - Plan for coronary angiography later today to investigate etiology  - Continue heparin drip, aspirin, atorvastatin  -Telemetry monitoring    2.  Chest pain  -See plan as above    3. 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 year old male who has a history of hypertension who presented to the emergency room last evening with complaints of chest pain.  Patient states that he has been having ongoing right arm, shoulder and neck radiation down into his arm but states that he started then to experience chest heaviness and therefore came to the emergency room for further evaluation.    His initial high-sensitivity troponin was 8, but continually trended up to a peak of 13,394.  Lipid panel also performed which revealed significantly elevated LDL of 175.  EKG revealed sinus bradycardia      REVIEW OF SYSTEMS:  Constitutional:  Denies fever or chills   Eyes:  Denies change in visual acuity   HENT:  Denies nasal congestion or sore throat   Respiratory:  Denies cough, orthopnea, PND or shortness of breath   Cardiovascular: + Chest pain, denies palpitations or edema   GI:  Denies abdominal pain, nausea, vomiting, bloody stools or diarrhea   :  Denies dysuria, frequency, difficulty  in micturition and nocturia  Musculoskeletal:  Denies back pain or joint pain   Neurologic:  Denies headache, focal weakness or sensory changes   Endocrine:  Denies polyuria or polydipsia   Lymphatic:  Denies swollen glands   Psychiatric:  Denies depression or anxiety     Historical Information   Past Medical History:   Diagnosis Date    Hypertension     Reflux gastritis      Past Surgical History:   Procedure Laterality Date    NOSE SURGERY       Social History     Substance and Sexual Activity   Alcohol Use Never     Social History     Substance and Sexual Activity   Drug Use Never     Social History     Tobacco Use   Smoking Status Never   Smokeless Tobacco Never       Family History: non-contributory    MEDS & ALLERGIES:  all current active meds have been reviewed and current meds:   Current Facility-Administered Medications   Medication Dose Route Frequency    acetaminophen (TYLENOL) tablet 650 mg  650 mg Oral Q6H PRN    aspirin chewable tablet 81 mg  81 mg Oral Daily    atorvastatin (LIPITOR) tablet 80 mg  80 mg Oral Daily With Dinner    citalopram (CeleXA) tablet 20 mg  20 mg Oral Daily    heparin (porcine) 25,000 units in 0.45% NaCl 250 mL infusion (premix)  3-20 Units/kg/hr (Order-Specific) Intravenous Titrated    heparin (porcine) injection 2,000 Units  2,000 Units Intravenous Q6H PRN    heparin (porcine) injection 4,000 Units  4,000 Units Intravenous Q6H PRN    HYDROmorphone (DILAUDID) injection 0.5 mg  0.5 mg Intravenous Q6H PRN    lidocaine (LIDODERM) 5 % patch 1 patch  1 patch Topical Daily    nitroglycerin (NITROSTAT) SL tablet 0.4 mg  0.4 mg Sublingual Q5 Min PRN    pantoprazole (PROTONIX) EC tablet 40 mg  40 mg Oral Early Morning     heparin (porcine), 3-20 Units/kg/hr (Order-Specific), Last Rate: 15.1 Units/kg/hr (01/05/24 1350)      Allergies   Allergen Reactions    Ketorolac Rash       OBJECTIVE:  Vitals:   Vitals:    01/05/24 1100   BP: 152/94   Pulse: 56   Resp: 18   Temp:    SpO2: 93%     Body  mass index is 29.29 kg/m².    Systolic (24hrs), Av , Min:133 , Max:198     Diastolic (24hrs), Av, Min:74, Max:99      Intake/Output Summary (Last 24 hours) at 2024 1357  Last data filed at 2024 1032  Gross per 24 hour   Intake 0 ml   Output 575 ml   Net -575 ml     Weight (last 2 days)       Date/Time Weight    24 0915 95.3 (210)    24 0105 95.7 (210.98)    24 1332 97.4 (214.73)          Invasive Devices       Peripheral Intravenous Line  Duration             Peripheral IV 24 Dorsal (posterior);Right Hand 1 day    Peripheral IV 24 Left;Dorsal (posterior) Forearm 1 day                    PHYSICAL EXAMS:  General:  Patient is not in acute distress, laying in the bed comfortably, awake, alert   Head: Normocephalic, Atraumatic.   HEENT: White sclera, pink conjunctiva  Neck:  Supple, no neck vein distention  Respiratory: clear to auscultation   Cardiovascular:  PMI normal, S1-S2 normal, no murmurs, thrills, gallops, rubs, regular rhythm  GI:  Abdomen soft, non-tender, non-distended  Extremities: No edema, normal pulses  Integument:  No skin rashes or ulceration  Lymphatic:  No cervical or inguinal lymphadenopathy  Neurologic:  Patient is awake alert, responding to command, oriented to person, place and time     LABORATORY RESULTS:      CBC with diff:   Results from last 7 days   Lab Units 24  0911 24  1239   WBC Thousand/uL 11.06* 10.46*   HEMOGLOBIN g/dL 13.9 15.1   HEMATOCRIT % 41.5 44.6   MCV fL 85 84   PLATELETS Thousands/uL 275 320   RBC Million/uL 4.89 5.29   MCH pg 28.4 28.5   MCHC g/dL 33.5 33.9   RDW % 12.8 12.6   MPV fL 9.1 9.0   NRBC AUTO /100 WBCs  --  0       CMP:  Results from last 7 days   Lab Units 24  0911 24  1239   POTASSIUM mmol/L 4.2 4.0   CHLORIDE mmol/L 105 103   CO2 mmol/L 25 24   BUN mg/dL 19 18   CREATININE mg/dL 0.93 1.00   CALCIUM mg/dL 9.3 9.7   AST U/L  --  23   ALT U/L  --  42   ALK PHOS U/L  --  91   EGFR ml/min/1.73sq  "m 88 81       BMP:  Results from last 7 days   Lab Units 01/05/24  0911 01/04/24  1239   POTASSIUM mmol/L 4.2 4.0   CHLORIDE mmol/L 105 103   CO2 mmol/L 25 24   BUN mg/dL 19 18   CREATININE mg/dL 0.93 1.00   CALCIUM mg/dL 9.3 9.7                Results from last 7 days   Lab Units 01/05/24  0441   HEMOGLOBIN A1C % 6.6*         Results from last 7 days   Lab Units 01/04/24  1534   INR  0.92       Lipid Profile:   No results found for: \"CHOL\"  Lab Results   Component Value Date    HDL 47 01/05/2024     Lab Results   Component Value Date    LDLCALC 175 (H) 01/05/2024     Lab Results   Component Value Date    TRIG 224 (H) 01/05/2024       Cardiac testing:   No results found for this or any previous visit.    No results found for this or any previous visit.    No valid procedures specified.  No results found for this or any previous visit.        Imaging:   I have personally reviewed pertinent reports.        EKG reviewed personally:  Sinus bradycardia       Code Status: Level 1 - Full Code      TANA Fernandez  1/5/2024,1:57 PM      "

## 2024-01-05 NOTE — ASSESSMENT & PLAN NOTE
Patient presented to the ER after sudden onset of chest pain associated with nausea, diaphoresis, and right shoulder pain.  Symptoms resolved spontaneously prior to arrival, but still had persistent right shoulder pain.  In the emergency room EKG was without ischemic changes however he had elevated troponin was admitted to the hospital service.  Troponins have continued to elevate currently at 13,000  Echo: 55%, no diastolic dysfunction, no wall motion abnormalities     Will need cardiac catheterization for further ischemic evaluation  Continue to monitor on telemetry  Continue aspirin and statin  Continue on heparin drip

## 2024-01-05 NOTE — PLAN OF CARE
Problem: CARDIOVASCULAR - ADULT  Goal: Maintains optimal cardiac output and hemodynamic stability  Description: INTERVENTIONS:  - Monitor I/O, vital signs and rhythm  - Monitor for S/S and trends of decreased cardiac output  - Administer and titrate ordered vasoactive medications to optimize hemodynamic stability  - Assess quality of pulses, skin color and temperature  - Assess for signs of decreased coronary artery perfusion  - Instruct patient to report change in severity of symptoms  Outcome: Progressing  Goal: Absence of cardiac dysrhythmias or at baseline rhythm  Description: INTERVENTIONS:  - Continuous cardiac monitoring, vital signs, obtain 12 lead EKG if ordered  - Administer antiarrhythmic and heart rate control medications as ordered  - Monitor electrolytes and administer replacement therapy as ordered  Outcome: Progressing     Problem: MUSCULOSKELETAL - ADULT  Goal: Maintain or return mobility to safest level of function  Description: INTERVENTIONS:  - Assess patient's ability to carry out ADLs; assess patient's baseline for ADL function and identify physical deficits which impact ability to perform ADLs (bathing, care of mouth/teeth, toileting, grooming, dressing, etc.)  - Assess/evaluate cause of self-care deficits   - Assess range of motion  - Assess patient's mobility  - Assess patient's need for assistive devices and provide as appropriate  - Encourage maximum independence but intervene and supervise when necessary  - Involve family in performance of ADLs  - Assess for home care needs following discharge   - Consider OT consult to assist with ADL evaluation and planning for discharge  - Provide patient education as appropriate  Outcome: Progressing  Goal: Maintain proper alignment of affected body part  Description: INTERVENTIONS:  - Support, maintain and protect limb and body alignment  - Provide patient/ family with appropriate education  Outcome: Progressing     Problem: PAIN - ADULT  Goal:  Verbalizes/displays adequate comfort level or baseline comfort level  Description: Interventions:  - Encourage patient to monitor pain and request assistance  - Assess pain using appropriate pain scale  - Administer analgesics based on type and severity of pain and evaluate response  - Implement non-pharmacological measures as appropriate and evaluate response  - Consider cultural and social influences on pain and pain management  - Notify physician/advanced practitioner if interventions unsuccessful or patient reports new pain  Outcome: Progressing     Problem: SAFETY ADULT  Goal: Patient will remain free of falls  Description: INTERVENTIONS:  - Educate patient/family on patient safety including physical limitations  - Instruct patient to call for assistance with activity   - Consult OT/PT to assist with strengthening/mobility   - Keep Call bell within reach  - Keep bed low and locked with side rails adjusted as appropriate  - Keep care items and personal belongings within reach  - Initiate and maintain comfort rounds  - Make Fall Risk Sign visible to staff  - Offer Toileting every 2 Hours, in advance of need  - Apply yellow socks and bracelet for high fall risk patients  - Consider moving patient to room near nurses station  Outcome: Progressing  Goal: Maintain or return to baseline ADL function  Description: INTERVENTIONS:  -  Assess patient's ability to carry out ADLs; assess patient's baseline for ADL function and identify physical deficits which impact ability to perform ADLs (bathing, care of mouth/teeth, toileting, grooming, dressing, etc.)  - Assess/evaluate cause of self-care deficits   - Assess range of motion  - Assess patient's mobility; develop plan if impaired  - Assess patient's need for assistive devices and provide as appropriate  - Encourage maximum independence but intervene and supervise when necessary  - Involve family in performance of ADLs  - Assess for home care needs following discharge   -  Consider OT consult to assist with ADL evaluation and planning for discharge  - Provide patient education as appropriate  Outcome: Progressing  Goal: Maintains/Returns to pre admission functional level  Description: INTERVENTIONS:  - Perform AM-PAC 6 Click Basic Mobility/ Daily Activity assessment daily.  - Set and communicate daily mobility goal to care team and patient/family/caregiver.   - Collaborate with rehabilitation services on mobility goals if consulted  - Perform Range of Motion 4 times a day.  - Reposition patient every 2 hours.  - Dangle patient 4 times a day  - Stand patient 4 times a day  - Ambulate patient 5 times a day  - Out of bed to chair 3 times a day   - Out of bed for meals 3 times a day  - Out of bed for toileting  - Record patient progress and toleration of activity level   Outcome: Progressing     Problem: DISCHARGE PLANNING  Goal: Discharge to home or other facility with appropriate resources  Description: INTERVENTIONS:  - Identify barriers to discharge w/patient and caregiver  - Arrange for needed discharge resources and transportation as appropriate  - Identify discharge learning needs (meds, wound care, etc.)  - Arrange for interpretive services to assist at discharge as needed  - Refer to Case Management Department for coordinating discharge planning if the patient needs post-hospital services based on physician/advanced practitioner order or complex needs related to functional status, cognitive ability, or social support system  Outcome: Progressing     Problem: Knowledge Deficit  Goal: Patient/family/caregiver demonstrates understanding of disease process, treatment plan, medications, and discharge instructions  Description: Complete learning assessment and assess knowledge base.  Interventions:  - Provide teaching at level of understanding  - Provide teaching via preferred learning methods  Outcome: Progressing

## 2024-01-05 NOTE — ASSESSMENT & PLAN NOTE
Known history of cervical spine abnormality per patient  CT Cervical Spine (1/3/24): Multilevel cervical spondylosis, as described above. Severe right foraminal narrowing is noted at C5-C6.  Could be contributory to the patient's right shoulder and bicep pain.  Follow-up CT of the right shoulder  Would benefit from outpatient neurosurgery evaluation

## 2024-01-05 NOTE — PLAN OF CARE
Problem: CARDIOVASCULAR - ADULT  Goal: Maintains optimal cardiac output and hemodynamic stability  Description: INTERVENTIONS:  - Monitor I/O, vital signs and rhythm  - Monitor for S/S and trends of decreased cardiac output  - Administer and titrate ordered vasoactive medications to optimize hemodynamic stability  - Assess quality of pulses, skin color and temperature  - Assess for signs of decreased coronary artery perfusion  - Instruct patient to report change in severity of symptoms  Outcome: Progressing  Goal: Absence of cardiac dysrhythmias or at baseline rhythm  Description: INTERVENTIONS:  - Continuous cardiac monitoring, vital signs, obtain 12 lead EKG if ordered  - Administer antiarrhythmic and heart rate control medications as ordered  - Monitor electrolytes and administer replacement therapy as ordered  Outcome: Progressing

## 2024-01-05 NOTE — QUICK NOTE
Notified at 0131 am about Mr. Noble chest pain. On assessment the patient reports right chest pain with radiation to the mid back rating it 4/10 in the numeric pain scale. The patient is alert and oriented x4, unlabored breathing, in good spirit. EKG taken at bedside reveals Sinus Bradycardia (otherwise normal EKG) with a HR of 50 bpm. High sensitivity troponin and D-dimer ordered, D-dimer is 0.37, Random troponin 4, 751. Patient is currently on heparin drip, cardiology consult in place, echo ordered for 1/5/24 and he remains NPO for possible cath. Nitroglycerin 0.4 mg sublingual and dilaudid 0.5mg q6hrs ordered for pain. Troponin trend and EKG's will continue. Patient telemetry is also continued. At 0210 nurse offered him medication to alleviate pain and patient states pain has resolved after speaking with this provider and nursing staff.

## 2024-01-06 ENCOUNTER — DOCUMENTATION (OUTPATIENT)
Dept: CARDIOLOGY CLINIC | Facility: CLINIC | Age: 59
End: 2024-01-06

## 2024-01-06 VITALS
WEIGHT: 210 LBS | SYSTOLIC BLOOD PRESSURE: 127 MMHG | TEMPERATURE: 98.4 F | OXYGEN SATURATION: 94 % | RESPIRATION RATE: 18 BRPM | HEIGHT: 71 IN | BODY MASS INDEX: 29.4 KG/M2 | DIASTOLIC BLOOD PRESSURE: 75 MMHG | HEART RATE: 75 BPM

## 2024-01-06 PROBLEM — R00.1 BRADYCARDIA, SINUS: Status: RESOLVED | Noted: 2024-01-04 | Resolved: 2024-01-06

## 2024-01-06 PROBLEM — I16.0 HYPERTENSIVE URGENCY: Status: RESOLVED | Noted: 2024-01-04 | Resolved: 2024-01-06

## 2024-01-06 LAB
ANION GAP SERPL CALCULATED.3IONS-SCNC: 8 MMOL/L
APTT PPP: 25 SECONDS (ref 23–37)
BUN SERPL-MCNC: 17 MG/DL (ref 5–25)
CALCIUM SERPL-MCNC: 9.3 MG/DL (ref 8.4–10.2)
CHLORIDE SERPL-SCNC: 102 MMOL/L (ref 96–108)
CO2 SERPL-SCNC: 26 MMOL/L (ref 21–32)
CREAT SERPL-MCNC: 0.93 MG/DL (ref 0.6–1.3)
ERYTHROCYTE [DISTWIDTH] IN BLOOD BY AUTOMATED COUNT: 13 % (ref 11.6–15.1)
GFR SERPL CREATININE-BSD FRML MDRD: 88 ML/MIN/1.73SQ M
GLUCOSE SERPL-MCNC: 136 MG/DL (ref 65–140)
GLUCOSE SERPL-MCNC: 136 MG/DL (ref 65–140)
HCT VFR BLD AUTO: 41.4 % (ref 36.5–49.3)
HGB BLD-MCNC: 14.1 G/DL (ref 12–17)
MAGNESIUM SERPL-MCNC: 2.1 MG/DL (ref 1.9–2.7)
MCH RBC QN AUTO: 29.3 PG (ref 26.8–34.3)
MCHC RBC AUTO-ENTMCNC: 34.1 G/DL (ref 31.4–37.4)
MCV RBC AUTO: 86 FL (ref 82–98)
PLATELET # BLD AUTO: 269 THOUSANDS/UL (ref 149–390)
PMV BLD AUTO: 9.3 FL (ref 8.9–12.7)
POTASSIUM SERPL-SCNC: 4.1 MMOL/L (ref 3.5–5.3)
RBC # BLD AUTO: 4.82 MILLION/UL (ref 3.88–5.62)
SODIUM SERPL-SCNC: 136 MMOL/L (ref 135–147)
WBC # BLD AUTO: 10.43 THOUSAND/UL (ref 4.31–10.16)

## 2024-01-06 PROCEDURE — 80048 BASIC METABOLIC PNL TOTAL CA: CPT

## 2024-01-06 PROCEDURE — 85730 THROMBOPLASTIN TIME PARTIAL: CPT

## 2024-01-06 PROCEDURE — 99239 HOSP IP/OBS DSCHRG MGMT >30: CPT | Performed by: PHYSICIAN ASSISTANT

## 2024-01-06 PROCEDURE — 82948 REAGENT STRIP/BLOOD GLUCOSE: CPT

## 2024-01-06 PROCEDURE — 85027 COMPLETE CBC AUTOMATED: CPT

## 2024-01-06 PROCEDURE — 83735 ASSAY OF MAGNESIUM: CPT

## 2024-01-06 RX ORDER — CLOPIDOGREL BISULFATE 75 MG/1
75 TABLET ORAL DAILY
Status: DISCONTINUED | OUTPATIENT
Start: 2024-01-07 | End: 2024-01-06 | Stop reason: HOSPADM

## 2024-01-06 RX ORDER — METOPROLOL SUCCINATE 25 MG/1
25 TABLET, EXTENDED RELEASE ORAL DAILY
Qty: 30 TABLET | Refills: 0 | Status: SHIPPED | OUTPATIENT
Start: 2024-01-06

## 2024-01-06 RX ORDER — CLOPIDOGREL BISULFATE 75 MG/1
75 TABLET ORAL DAILY
Qty: 30 TABLET | Refills: 1 | Status: SHIPPED | OUTPATIENT
Start: 2024-01-07

## 2024-01-06 RX ORDER — CLOPIDOGREL BISULFATE 75 MG/1
600 TABLET ORAL ONCE
Status: DISCONTINUED | OUTPATIENT
Start: 2024-01-06 | End: 2024-01-06 | Stop reason: HOSPADM

## 2024-01-06 RX ORDER — NITROGLYCERIN 0.4 MG/1
0.4 TABLET SUBLINGUAL
Qty: 30 TABLET | Refills: 0 | Status: SHIPPED | OUTPATIENT
Start: 2024-01-06

## 2024-01-06 RX ORDER — ONDANSETRON 4 MG/1
4 TABLET, ORALLY DISINTEGRATING ORAL EVERY 6 HOURS PRN
Qty: 20 TABLET | Refills: 0 | Status: SHIPPED | OUTPATIENT
Start: 2024-01-06

## 2024-01-06 RX ORDER — ATORVASTATIN CALCIUM 80 MG/1
80 TABLET, FILM COATED ORAL
Qty: 30 TABLET | Refills: 0 | Status: SHIPPED | OUTPATIENT
Start: 2024-01-06

## 2024-01-06 RX ORDER — METOPROLOL SUCCINATE 25 MG/1
25 TABLET, EXTENDED RELEASE ORAL DAILY
Status: DISCONTINUED | OUTPATIENT
Start: 2024-01-06 | End: 2024-01-06 | Stop reason: HOSPADM

## 2024-01-06 RX ORDER — ASPIRIN 81 MG/1
81 TABLET, CHEWABLE ORAL DAILY
Start: 2024-01-06

## 2024-01-06 RX ADMIN — PANTOPRAZOLE SODIUM 40 MG: 40 TABLET, DELAYED RELEASE ORAL at 05:03

## 2024-01-06 RX ADMIN — TICAGRELOR 90 MG: 90 TABLET ORAL at 09:22

## 2024-01-06 RX ADMIN — ONDANSETRON 4 MG: 2 INJECTION INTRAMUSCULAR; INTRAVENOUS at 08:25

## 2024-01-06 RX ADMIN — ONDANSETRON 4 MG: 2 INJECTION INTRAMUSCULAR; INTRAVENOUS at 02:24

## 2024-01-06 RX ADMIN — HYDROMORPHONE HYDROCHLORIDE 0.5 MG: 1 INJECTION, SOLUTION INTRAMUSCULAR; INTRAVENOUS; SUBCUTANEOUS at 02:24

## 2024-01-06 RX ADMIN — ASPIRIN 81 MG: 81 TABLET, CHEWABLE ORAL at 09:22

## 2024-01-06 RX ADMIN — CITALOPRAM HYDROBROMIDE 20 MG: 20 TABLET ORAL at 09:22

## 2024-01-06 NOTE — PROGRESS NOTES
Cardiology Progress Note - Jaime Noble 58 y.o. male MRN: [id number]    Unit/Bed#:  Encounter: [id number]      Assessment:  Non-STEMI.  Single-vessel CAD status post circumflex kissing stents.  Hypertension  Sinus bradycardia  Mixed hyperlipidemia  Cervical spondylolysis      Plan:  He is stable for discharge status post non-STEMI and kissing circumflex stents.  We will switch over to Plavix given cost concerns.  Continue dual antiplatelet therapy minimum of 1 year.  We will give a trial of metoprolol XL 25 mg daily post MI.  Follow-up in our cardiology office.    Subjective:   Patient seen and examined.    No significant events overnight. Denies chest pain, pressure, shortness of breath pnd, orthopnea, abdominal pain, nausea    Objective:     TELEMETRY:  No significant arrhythmias seen on telemetry review. NSR no significant arrhythmias    Physical Exam:    GEN: Jaime Noble appears well, alert and oriented x 3, pleasant and cooperative   HEENT: anicteric, mucous membranes moist  NECK: no jvd, carotid bruits   HEART: regular rhythm, normal S1 and S2, no murmurs, clicks, gallops or rubs   LUNGS: clear to auscultation bilaterally; no wheezes, rales, or rhonchi   ABDOMEN: normal bowel sounds, soft, no tenderness, no distention  EXTREMITIES: peripheral pulses normal; no clubbing, cyanosis, or edema.  Right radial site is normal.  NEURO: no focal findings   SKIN: normal without suspicious lesions on exposed skin      Current Outpatient Medications:     aspirin 81 mg chewable tablet, Chew 1 tablet (81 mg total) daily, Disp: , Rfl:     atorvastatin (LIPITOR) 80 mg tablet, Take 1 tablet (80 mg total) by mouth daily with dinner, Disp: 30 tablet, Rfl: 0    citalopram (CeleXA) 20 mg tablet, Take 20 mg by mouth daily, Disp: , Rfl:     [START ON 1/7/2024] clopidogrel (PLAVIX) 75 mg tablet, Take 1 tablet (75 mg total) by mouth daily Do not start before January 7, 2024., Disp: 30 tablet, Rfl: 1    metoprolol succinate (TOPROL-XL)  25 mg 24 hr tablet, Take 1 tablet (25 mg total) by mouth daily, Disp: 30 tablet, Rfl: 0    nitroglycerin (NITROSTAT) 0.4 mg SL tablet, Place 1 tablet (0.4 mg total) under the tongue every 5 (five) minutes as needed for chest pain (monitor blood pressure.), Disp: 30 tablet, Rfl: 0    omeprazole (PriLOSEC) 40 MG capsule, Take 40 mg by mouth daily, Disp: , Rfl:     ondansetron (ZOFRAN-ODT) 4 mg disintegrating tablet, Take 1 tablet (4 mg total) by mouth every 6 (six) hours as needed for nausea or vomiting, Disp: 20 tablet, Rfl: 0  No current facility-administered medications for this visit.    Facility-Administered Medications Ordered in Other Visits:     acetaminophen (TYLENOL) tablet 650 mg, 650 mg, Oral, Q6H PRN, TANA Bradshaw, 650 mg at 01/05/24 1422    aspirin chewable tablet 81 mg, 81 mg, Oral, Daily, TANA Bradshaw, 81 mg at 01/06/24 0922    atorvastatin (LIPITOR) tablet 80 mg, 80 mg, Oral, Daily With Dinner, TANA Bradshaw    citalopram (CeleXA) tablet 20 mg, 20 mg, Oral, Daily, TANA Bradshaw, 20 mg at 01/06/24 0922    clopidogrel (PLAVIX) tablet 600 mg, 600 mg, Oral, Once, TANA Fernandez    [START ON 1/7/2024] clopidogrel (PLAVIX) tablet 75 mg, 75 mg, Oral, Daily, TANA Fernandez    hydrALAZINE (APRESOLINE) injection 10 mg, 10 mg, Intravenous, Q6H PRN, TANA Bradshaw, 10 mg at 01/05/24 1858    HYDROmorphone (DILAUDID) injection 0.5 mg, 0.5 mg, Intravenous, Q6H PRN, TANA Bradshaw, 0.5 mg at 01/06/24 0224    labetalol (NORMODYNE) injection 10 mg, 10 mg, Intravenous, Q6H PRN, TANA Bradshaw    lidocaine (LIDODERM) 5 % patch 1 patch, 1 patch, Topical, Daily, TANA Bradshaw, 1 patch at 01/05/24 0504    metoprolol succinate (TOPROL-XL) 24 hr tablet 25 mg, 25 mg, Oral, Daily, TANA Fernandez    nitroglycerin (NITROSTAT) SL tablet 0.4 mg, 0.4 mg, Sublingual, Q5 Min  "PRN, TANA Bradshaw    ondansetron (ZOFRAN) injection 4 mg, 4 mg, Intravenous, Q6H PRN, TANA Bradshaw, 4 mg at 01/06/24 0825    pantoprazole (PROTONIX) EC tablet 40 mg, 40 mg, Oral, Early Morning, TANA Bradshaw, 40 mg at 01/06/24 0503    Labs & Results:    Lab Results   Component Value Date    CALCIUM 9.3 01/06/2024    K 4.1 01/06/2024    CO2 26 01/06/2024     01/06/2024    BUN 17 01/06/2024    CREATININE 0.93 01/06/2024       Lab Results   Component Value Date    WBC 10.43 (H) 01/06/2024    HGB 14.1 01/06/2024    HCT 41.4 01/06/2024    MCV 86 01/06/2024     01/06/2024     Results from last 7 days   Lab Units 01/04/24  1534   INR  0.92       No results found for: \"CHOL\"  Lab Results   Component Value Date    HDL 47 01/05/2024     Lab Results   Component Value Date    LDLCALC 175 (H) 01/05/2024     Lab Results   Component Value Date    TRIG 224 (H) 01/05/2024       Lab Results   Component Value Date    ALT 42 01/04/2024    AST 23 01/04/2024    ALKPHOS 91 01/04/2024         EKG personally reviewed by )Lopez Dial MD. NSR, no acute ST changes             "

## 2024-01-06 NOTE — ASSESSMENT & PLAN NOTE
NSTEMI, s/p cardiac catheterization with 2 NYA placed  Continue aspirin, lipitor, plavix on discharge  Nitroglycerin as needed for recurrent chest pain  Cardiology follow up  Dietary and lifestyle modifications counseled with patient

## 2024-01-06 NOTE — ASSESSMENT & PLAN NOTE
Present on admission with a leukocytosis of 10.46  Not meeting SIRS criteria  Reactive secondary to cardiac issues as above, no sign of active infection

## 2024-01-06 NOTE — ASSESSMENT & PLAN NOTE
Present on admission with heart rates ranging between 40-60  Atenolol has been discontinued, replaced with metoprolol XL

## 2024-01-06 NOTE — ASSESSMENT & PLAN NOTE
Resolved, Blood Pressure: 127/75  Discontinue atenolol due to low BP and low HR   Trial metoprolol XL on discharge

## 2024-01-06 NOTE — ASSESSMENT & PLAN NOTE
Patient presented to the ER after sudden onset of chest pain associated with nausea, diaphoresis, and right shoulder pain.  Symptoms resolved spontaneously prior to arrival, but still had persistent right shoulder pain.  In the emergency room EKG was without ischemic changes however he had elevated troponin was admitted to the hospital service.  Troponin significantly elevated >10,000  Echo: 55%, no diastolic dysfunction, no wall motion abnormalities   S/p cardiac catheterization 1/5/24 with evidence of CAD   Continue aspirin and statin, add plavix and metoprolol  Hemodynamically stable for discharge with cardiology follow up

## 2024-01-06 NOTE — DISCHARGE SUMMARY
[facility]  Discharge- Jaime Noble 1965, 58 y.o. male MRN: [id number]  Unit/Bed#: -[facility] Encounter: [id number]  Primary Care Provider: No primary care provider on file.   Date and time admitted to hospital: 1/4/2024 12:36 PM    * Chest pain, unspecified  Assessment & Plan  Patient presented to the ER after sudden onset of chest pain associated with nausea, diaphoresis, and right shoulder pain.  Symptoms resolved spontaneously prior to arrival, but still had persistent right shoulder pain.  In the emergency room EKG was without ischemic changes however he had elevated troponin was admitted to the hospital service.  Troponin significantly elevated >10,000  Echo: 55%, no diastolic dysfunction, no wall motion abnormalities   S/p cardiac catheterization 1/5/24 with evidence of CAD   Continue aspirin and statin, add plavix and metoprolol  Hemodynamically stable for discharge with cardiology follow up     Elevated troponin  Assessment & Plan  NSTEMI, s/p cardiac catheterization with 2 NYA placed  Continue aspirin, lipitor, plavix on discharge  Nitroglycerin as needed for recurrent chest pain  Cardiology follow up  Dietary and lifestyle modifications counseled with patient     Hypertensive urgency-resolved as of 1/6/2024  Assessment & Plan  Resolved, Blood Pressure: 127/75  Discontinue atenolol due to low BP and low HR   Trial metoprolol XL on discharge     Bradycardia, sinus-resolved as of 1/6/2024  Assessment & Plan  Present on admission with heart rates ranging between 40-60  Atenolol has been discontinued, replaced with metoprolol XL     Mixed hyperlipidemia  Assessment & Plan  Lipid panel uncontrolled  Total chol 267, Triglycerides 224,   Continue with high-intensity statin    Cervical spondylolysis  Assessment & Plan  Known history of cervical spine abnormality per patient  CT Cervical Spine (1/3/24): Multilevel cervical spondylosis, as described above. Severe right foraminal  narrowing is noted at C5-C6.  Could be contributory to the patient's right shoulder and bicep pain.  Follow-up CT of the right shoulder  Would benefit from outpatient neurosurgery evaluation    Leukocytosis-resolved as of 1/5/2024  Assessment & Plan  Present on admission with a leukocytosis of 10.46  Not meeting SIRS criteria  Reactive secondary to cardiac issues as above, no sign of active infection      Medical Problems       Resolved Problems  Date Reviewed: 1/6/2024            Resolved    Leukocytosis 1/5/2024     Resolved by  Chad Mcclain DO    Hypertensive urgency 1/6/2024     Resolved by  Kelsey Thapa PA-C    Bradycardia, sinus 1/6/2024     Resolved by  Kelsey Thapa PA-C        Discharging Physician / Practitioner: Kelsey Thapa PA-C  PCP: No primary care provider on file.  Admission Date:   Admission Orders (From admission, onward)       Ordered        01/04/24 1527  INPATIENT ADMISSION  Once                          Discharge Date: 01/06/24    Consultations During Hospital Stay:  IP CONSULT TO CARDIOLOGY     Procedures Performed:   Cardiac catheterization 1/5/24:     1st Mrg lesion is 75% stenosed    Prox Cx to Mid Cx lesion is 100% stenosed    Significant Findings / Test Results:   CT upper extremity wo contrast right Result Date: 1/5/2024  No acute osseous abnormality. Workstation performed: [id number]     CT spine cervical wo contrast Result Date: 1/5/2024  Multilevel cervical spondylosis, as described above. Severe right foraminal narrowing is noted at C5-C6. Workstation performed: [id number]     XR chest 1 view portable Result Date: 1/4/2024  No acute cardiopulmonary disease. Workstation performed: [id number]     CTA dissection protocol chest/abdomen/pelvis Result Date: 1/4/2024  1.  No acute aortic, thoracic or abdominopelvic pathology. 2.  Hepatic steatosis. Workstation performed: [id number]      Echocardiogram Result Date 1/5/2024  LVEF 55%, systolic function normal, wall motion  "normal, diastolic function normal    Incidental Findings:   NSTEMI as above    I reviewed the above mentioned incidental findings with the patient and/or family and they expressed understanding.    Test Results Pending at Discharge (will require follow up):        Outpatient Tests Requested:      Complications:  none     Reason for Admission: ACS rule out     Hospital Course:   Jaime Noble is a 58 y.o. male patient who originally presented to the hospital on 1/4/2024 due to chest pain, shoulder pain, nausea and diaphoresis. Patient has PMH of HTN only prior to hospitalization. Workup here revealing NSTEMI with CAD noted on cardiac catheterization. He received 2 stents with good outcome. Will need DAPT for 1 year, statin and beta blocker along with aspirin.    He did have some nausea on day of discharge, responded well to zofran. Unclear if this is related to meds or nerves. Continue zofran as needed on discharge.    All questions answered to the best of my ability at this time to patient/family satisfaction. Plan of care agreed upon by all.     Please see above list of diagnoses and related plan for additional information.     Condition at Discharge: good    Discharge Day Visit / Exam:   Subjective:  patient seen lying bed, has a headache he thinks from how he slept. Nausea is gone following zofran administration. All chest pain and arm pain is gone. He denies shortness of breath as well as palpitations.   Vitals: Blood Pressure: 105/79 (01/06/24 0732)  Pulse: 73 (01/06/24 0732)  Temperature: 98.4 °F (36.9 °C) (01/06/24 0732)  Temp Source: Oral (01/06/24 0732)  Respirations: 18 (01/06/24 0732)  Height: 5' 11\" (180.3 cm) (01/05/24 0915)  Weight - Scale: 95.3 kg (210 lb) (01/05/24 0915)  SpO2: 96 % (01/06/24 0732)  Exam:   Physical Exam  Vitals and nursing note reviewed.   Constitutional:       General: He is awake. He is not in acute distress.     Appearance: Normal appearance. He is well-developed, well-groomed and " overweight. He is not ill-appearing or toxic-appearing.   Cardiovascular:      Rate and Rhythm: Normal rate and regular rhythm.      Heart sounds: Normal heart sounds. No murmur heard.     No gallop.   Pulmonary:      Effort: Pulmonary effort is normal. No respiratory distress.      Breath sounds: Normal breath sounds. No wheezing.   Abdominal:      General: Bowel sounds are normal. There is no distension.      Palpations: Abdomen is soft.   Skin:     Comments: Right wrist site c/d/i   Neurological:      Mental Status: He is alert and oriented to person, place, and time.   Psychiatric:         Mood and Affect: Mood normal.         Behavior: Behavior normal. Behavior is cooperative.          Discussion with Family: Updated  (family member) at bedside.    Discharge instructions/Information to patient and family:   See after visit summary for information provided to patient and family.      Provisions for Follow-Up Care:  See after visit summary for information related to follow-up care and any pertinent home health orders.      Mobility at time of Discharge:   Basic Mobility Inpatient Raw Score: 24  JH-HLM Goal: 8: Walk 250 feet or more  JH-HLM Achieved: 3: Sit at edge of bed  Improperly assessed      Disposition:   Home    Planned Readmission: no     Discharge Statement:  I spent 50 minutes discharging the patient. This time was spent on the day of discharge. I had direct contact with the patient on the day of discharge. Greater than 50% of the total time was spent examining patient, answering all patient questions, arranging and discussing plan of care with patient as well as directly providing post-discharge instructions.  Additional time then spent on discharge activities.    Discharge Medications:  See after visit summary for reconciled discharge medications provided to patient and/or family.      **Please Note: This note may have been constructed using a voice recognition system**

## 2024-01-06 NOTE — ASSESSMENT & PLAN NOTE
Lipid panel uncontrolled  Total chol 267, Triglycerides 224,   Continue with high-intensity statin

## 2024-01-06 NOTE — PLAN OF CARE
Problem: CARDIOVASCULAR - ADULT  Goal: Maintains optimal cardiac output and hemodynamic stability  Description: INTERVENTIONS:  - Monitor I/O, vital signs and rhythm  - Monitor for S/S and trends of decreased cardiac output  - Administer and titrate ordered vasoactive medications to optimize hemodynamic stability  - Assess quality of pulses, skin color and temperature  - Assess for signs of decreased coronary artery perfusion  - Instruct patient to report change in severity of symptoms  Outcome: Progressing  Goal: Absence of cardiac dysrhythmias or at baseline rhythm  Description: INTERVENTIONS:  - Continuous cardiac monitoring, vital signs, obtain 12 lead EKG if ordered  - Administer antiarrhythmic and heart rate control medications as ordered  - Monitor electrolytes and administer replacement therapy as ordered  Outcome: Progressing     Problem: PAIN - ADULT  Goal: Verbalizes/displays adequate comfort level or baseline comfort level  Description: Interventions:  - Encourage patient to monitor pain and request assistance  - Assess pain using appropriate pain scale  - Administer analgesics based on type and severity of pain and evaluate response  - Implement non-pharmacological measures as appropriate and evaluate response  - Consider cultural and social influences on pain and pain management  - Notify physician/advanced practitioner if interventions unsuccessful or patient reports new pain  Outcome: Progressing     Problem: DISCHARGE PLANNING  Goal: Discharge to home or other facility with appropriate resources  Description: INTERVENTIONS:  - Identify barriers to discharge w/patient and caregiver  - Arrange for needed discharge resources and transportation as appropriate  - Identify discharge learning needs (meds, wound care, etc.)  - Arrange for interpretive services to assist at discharge as needed  - Refer to Case Management Department for coordinating discharge planning if the patient needs post-hospital  services based on physician/advanced practitioner order or complex needs related to functional status, cognitive ability, or social support system  Outcome: Progressing     Problem: Knowledge Deficit  Goal: Patient/family/caregiver demonstrates understanding of disease process, treatment plan, medications, and discharge instructions  Description: Complete learning assessment and assess knowledge base.  Interventions:  - Provide teaching at level of understanding  - Provide teaching via preferred learning methods  Outcome: Progressing

## 2024-01-15 ENCOUNTER — OFFICE VISIT (OUTPATIENT)
Dept: CARDIOLOGY CLINIC | Facility: CLINIC | Age: 59
End: 2024-01-15

## 2024-01-15 VITALS
DIASTOLIC BLOOD PRESSURE: 80 MMHG | BODY MASS INDEX: 28.84 KG/M2 | OXYGEN SATURATION: 98 % | HEIGHT: 71 IN | RESPIRATION RATE: 16 BRPM | SYSTOLIC BLOOD PRESSURE: 128 MMHG | WEIGHT: 206 LBS | HEART RATE: 68 BPM

## 2024-01-15 DIAGNOSIS — E78.00 PURE HYPERCHOLESTEROLEMIA: ICD-10-CM

## 2024-01-15 DIAGNOSIS — I25.10 CORONARY ARTERY DISEASE INVOLVING NATIVE HEART WITHOUT ANGINA PECTORIS, UNSPECIFIED VESSEL OR LESION TYPE: Primary | ICD-10-CM

## 2024-01-15 DIAGNOSIS — I10 PRIMARY HYPERTENSION: ICD-10-CM

## 2024-01-15 PROCEDURE — 99214 OFFICE O/P EST MOD 30 MIN: CPT | Performed by: PHYSICIAN ASSISTANT

## 2024-01-15 NOTE — PROGRESS NOTES
[facility] [place]  [address]  [place] [address]  Cardiology Follow Up    Jaime Noble  1965  [id number]    1. Coronary artery disease involving native heart without angina pectoris, unspecified vessel or lesion type        2. Primary hypertension        3. Pure hypercholesterolemia            Discussion/Summary:  CAD status post STEMI with kissing stents placed to OM 2 and mid circumflex.  Overall doing well.  Continue aspirin, Lipitor, Plavix, Toprol.  Patient denies any bleeding troubles on Plavix and aspirin.  Patient advised to remain on Plavix and aspirin for the next 1 year without interruption.  Patient unfortunately has no insurance and will not be able to attend cardiac rehab at this time.  Patient advised about good cardiac diet.  All questions answered.  Avoid risky behaviors including ladders, chainsaws etc.    2.  Hypertension, stable continue with Toprol    3.  Hyperlipidemia on Lipitor    Continue medications.  Report any symptoms.  Report any bleeding.  Unfortunately patient has no insurance and therefore would not be able to attend cardiac rehab.  All questions answered.  Reportedly bleeding.  Continue with cardiac diet.  Advised to avoid risky behaviors including ladders, chainsaws, shoveling snow.  Follow-up with PCP.  Plan to stay on Plavix and aspirin for the next 1 year without interruption.  Continue all medications. Previous studies reviewed with patient, medications reviewed and possible side effects discussed. Continue risk factor modification. Optimize weight, regular exercise and follow up with appropriate specialists and primary care physician as discussed.  All questions answered. Patient advised to report any problems prompting to medical attention. Return for follow up visit in 4 months or earlier if needed    Chief Complaint   Patient presents with    Follow-up       Interval History: Patient presents to the office for routine follow-up  visit after recent hospitalization.  Patient presented to the hospital on 1/4/2024 with complaints of chest pain.  Patient states he was actually having chest pain, right arm pain with pain across her shoulders and into the neck and down his arm.  He described as a heaviness.  Initial troponin was 8 but then peaked up to 13,000.  Patient had cardiac catheterization the next day which revealed 75% marginal lesion and proximal to mid circumflex lesion 100%, patient had kissing stents placed to OM 2 and mid circumflex.  Patient had no other blockages.  Echocardiogram done EF 55%.  Patient initially placed on Brilinta but given lack of insurance was then switched over to Plavix.  Patient states since being out of the hospital he is overall feeling well.  Denies any chest pain, chest pressure, chest heaviness but denies any shortness of breath, dizziness, palpitations.  Denies any bleeding troubles on Plavix and aspirin.  Taking all medications without side effects.  Accompanied to the visit with his son.    Patient Active Problem List   Diagnosis    Chest pain, unspecified    Elevated troponin    Mixed hyperlipidemia    Cervical spondylolysis     Past Medical History:   Diagnosis Date    Hypertension     Reflux gastritis      Social History     Socioeconomic History    Marital status: Single     Spouse name: Not on file    Number of children: Not on file    Years of education: Not on file    Highest education level: Not on file   Occupational History    Not on file   Tobacco Use    Smoking status: Never    Smokeless tobacco: Never   Vaping Use    Vaping status: Never Used   Substance and Sexual Activity    Alcohol use: Never    Drug use: Never    Sexual activity: Not on file   Other Topics Concern    Not on file   Social History Narrative    Not on file     Social Determinants of Health     Financial Resource Strain: Not on file   Food Insecurity: Not on file   Transportation Needs: Not on file   Physical Activity: Not  on file   Stress: Not on file   Social Connections: Not on file   Intimate Partner Violence: Not on file   Housing Stability: Not on file      History reviewed. No pertinent family history.  Past Surgical History:   Procedure Laterality Date    CARDIAC CATHETERIZATION N/A 1/5/2024    Procedure: Cardiac catheterization;  Surgeon: Lopez Dial MD;  Location: [facility];  Service: Cardiology    CARDIAC CATHETERIZATION Left 1/5/2024    Procedure: Cardiac Left Heart Cath;  Surgeon: Lopez Dial MD;  Location: [facility];  Service: Cardiology    CARDIAC CATHETERIZATION N/A 1/5/2024    Procedure: Cardiac Coronary Angiogram;  Surgeon: Lopez Dial MD;  Location: [facility];  Service: Cardiology    CARDIAC CATHETERIZATION N/A 1/5/2024    Procedure: Cardiac pci;  Surgeon: Lopez Dial MD;  Location: [facility];  Service: Cardiology    NOSE SURGERY         Current Outpatient Medications:     aspirin 81 mg chewable tablet, Chew 1 tablet (81 mg total) daily, Disp: , Rfl:     atorvastatin (LIPITOR) 80 mg tablet, Take 1 tablet (80 mg total) by mouth daily with dinner, Disp: 30 tablet, Rfl: 0    citalopram (CeleXA) 20 mg tablet, Take 20 mg by mouth daily, Disp: , Rfl:     clopidogrel (PLAVIX) 75 mg tablet, Take 1 tablet (75 mg total) by mouth daily Do not start before January 7, 2024., Disp: 30 tablet, Rfl: 1    diclofenac sodium (VOLTAREN) 50 mg EC tablet, Take 50 mg by mouth 3 (three) times a day as needed, Disp: , Rfl:     metoprolol succinate (TOPROL-XL) 25 mg 24 hr tablet, Take 1 tablet (25 mg total) by mouth daily, Disp: 30 tablet, Rfl: 0    nitroglycerin (NITROSTAT) 0.4 mg SL tablet, Place 1 tablet (0.4 mg total) under the tongue every 5 (five) minutes as needed for chest pain (monitor blood pressure.), Disp: 30 tablet, Rfl: 0    omeprazole (PriLOSEC) 40 MG capsule, Take 40 mg by mouth daily, Disp: , Rfl:     ondansetron (ZOFRAN-ODT) 4 mg disintegrating tablet, Take  1 tablet (4 mg total) by mouth every 6 (six) hours as needed for nausea or vomiting, Disp: 20 tablet, Rfl: 0  Allergies   Allergen Reactions    Ketorolac Rash         Imaging: Cardiac catheterization    Result Date: 1/5/2024  Narrative:   1st Mrg lesion is 75% stenosed.   Prox Cx to Mid Cx lesion is 100% stenosed. Mid circumflex occlusion just past the OM 2 consistent with the infarct artery. Severe proximal OM 2 stenosis. Successful kissing 3.5/12 mm Bingen drug-eluting stents to the OM 2 and mid circumflex. A 6 French EBU 3.75 guide was used with a run-through wire in the OM 2 and BMW in the mid circumflex.  PTCA of both vessels was performed with a 3.5/12 mm NC balloon.  The stents were then simultaneously implanted in kissing fashion and postdilated with 3.5/12 mm NC balloons in kissing fashion.  The initial 75 and 100% stenoses were reduced to 0% with a beautiful angiographic result.     Echo complete w/ contrast if indicated    Result Date: 1/5/2024  Narrative:   Left Ventricle: Left ventricular cavity size is normal. Wall thickness is normal. The left ventricular ejection fraction is 55%. Systolic function is normal. Wall motion is normal. Diastolic function is normal.     CT upper extremity wo contrast right    Result Date: 1/5/2024  Narrative: CT RIGHT SHOULDER WITHOUT IV CONTRAST INDICATION: extreme pain. COMPARISON: None. TECHNIQUE: CT examination of the above was performed. This examination, like all CT scans performed [facility] the [facility], was performed utilizing techniques to minimize radiation dose exposure, including the use of iterative reconstruction and automated exposure control software.  Multiplanar 2D reformatted images were created from the source data. Rad dose  549 mGy-cm FINDINGS: OSSEOUS STRUCTURES:  No fracture, dislocation or destructive osseous lesion. VISUALIZED MUSCULATURE:  Unremarkable. SOFT TISSUES:  Unremarkable. OTHER PERTINENT FINDINGS:  None.     Impression: No  acute osseous abnormality. Workstation performed: [id number]     CT spine cervical wo contrast    Result Date: 1/5/2024  Narrative: CT CERVICAL SPINE - WITHOUT CONTRAST INDICATION:   Pain shooting down into right shoulder. COMPARISON:  None. TECHNIQUE:  CT examination of the cervical spine was performed without intravenous contrast.  Contiguous axial images were obtained. Multiplanar 2D reformatted images were created from the source data. Radiation dose length product (DLP) for this visit:  566 mGy-cm .  This examination, like all CT scans performed in the [facility], was performed utilizing techniques to minimize radiation dose exposure, including the use of iterative reconstruction and automated exposure control. IMAGE QUALITY:  Diagnostic. FINDINGS: ALIGNMENT: There is reversal of the normal cervical lordosis centered at C4-C5. There is no significant spondylolisthesis. Atlantodental distance is preserved. VERTEBRAE:  No fracture. DEGENERATIVE CHANGES: C2-C3: There is facet arthrosis. There is no significant canal stenosis or foraminal narrowing. C3-C4: There is left-sided uncovertebral spurring and facet arthrosis. There is moderate left foraminal narrowing. There is no significant canal stenosis. There is no significant right foraminal narrowing. C4-C5: There is a disc osteophyte complex with uncovertebral spurring. There is mild canal stenosis. There is moderate right foraminal narrowing. There is no significant left foraminal narrowing. C5-C6: There is a disc osteophyte complex with uncovertebral spurring. There is mild canal stenosis. There is no significant left foraminal narrowing. There is severe right foraminal narrowing. C6-C7: There is a disc osteophyte complex with left-sided uncovertebral spurring. There is moderate left foraminal narrowing. There is mild canal stenosis. There is no significant right foraminal narrowing. C7-T1: There is no significant canal stenosis or foraminal  narrowing. PREVERTEBRAL AND PARASPINAL SOFT TISSUES: Unremarkable THORACIC INLET:  Normal.     Impression: Multilevel cervical spondylosis, as described above. Severe right foraminal narrowing is noted at C5-C6. Workstation performed: [id number]     XR chest 1 view portable    Result Date: 1/4/2024  Narrative: CHEST INDICATION:   cp. COMPARISON:  None EXAM PERFORMED/VIEWS:  XR CHEST PORTABLE FINDINGS: Cardiomediastinal silhouette appears unremarkable. The lungs are clear.  No pneumothorax or pleural effusion. Osseous structures appear within normal limits for patient age.     Impression: No acute cardiopulmonary disease. Workstation performed: [id number]     CTA dissection protocol chest/abdomen/pelvis    Result Date: 1/4/2024  Narrative: CTA - CHEST, ABDOMEN AND PELVIS - WITHOUT AND WITH IV CONTRAST INDICATION:   chest pain. COMPARISON:  None. TECHNIQUE: CT examination of the chest, abdomen and pelvis was performed both prior to and after the administration of intravenous contrast.   The noncontrast portion of this examination was performed utilizing low radiation dose technique.   Thin section angiographic arterial phase post contrast technique was used in order to evaluate for aortic dissection.  3D reformatted images and volume rendering were performed on an independent workstation.  Multiplanar 2D reformatted images were created from the source data. Radiation dose length product (DLP) for this visit:  1672 mGy-cm .  This examination, like all CT scans performed in the [facility], was performed utilizing techniques to minimize radiation dose exposure, including the use of iterative reconstruction and automated exposure control. IV Contrast:  100 mL of iohexol (OMNIPAQUE) Enteric Contrast:  Enteric contrast was not administered. FINDINGS: AORTA:  There is no aortic dissection or intramural hematoma. There is no aortic aneurysm.   Ascending thoracic aorta measures 3.3 cm. Descending thoracic  aorta measures 2.5 cm. Minimal amount of calcified atherosclerosis at the aortic arch. The left vertebral artery arises from the aortic arch. The aorta at the diaphragmatic hiatus measures 2.2 cm. Patent splanchnic vessels. Infrarenal aorta measures 1.8 cm. Moderate degree of calcified atherosclerosis of the abdominal aorta. CHEST LUNGS:  Lungs are clear.  There is no tracheal or endobronchial lesion. PLEURA:  Unremarkable. HEART/PULMONARY ARTERIAL TREE:  Unremarkable for patient's age. MEDIASTINUM AND GISEL:  Unremarkable. CHEST WALL AND LOWER NECK:  Unremarkable. ABDOMEN LIVER/BILIARY TREE: Hepatic steatosis. No suspicious hepatic lesion. No surface nodularity. No biliary ductal dilation. GALLBLADDER:  No calcified gallstones. No pericholecystic inflammatory change. SPLEEN:  Unremarkable. PANCREAS:  Unremarkable. ADRENAL GLANDS:  Unremarkable. KIDNEYS/URETERS: One or more simple renal cyst(s) is noted.  Otherwise unremarkable kidneys.  No hydronephrosis. STOMACH AND BOWEL: There is colonic diverticulosis without evidence of acute diverticulitis. APPENDIX: A normal appendix was visualized. ABDOMINOPELVIC CAVITY:  No ascites or free intraperitoneal air. No lymphadenopathy. PELVIS REPRODUCTIVE ORGANS:  Unremarkable for patient's age. URINARY BLADDER:  Unremarkable. ABDOMINAL WALL/INGUINAL REGIONS:  Unremarkable. OSSEOUS STRUCTURES:  No acute fracture or destructive osseous lesion. Discogenic degenerative change L5-S1.     Impression: 1.  No acute aortic, thoracic or abdominopelvic pathology. 2.  Hepatic steatosis. Workstation performed: [id number]       Review of Systems:  Review of Systems   Constitutional: Negative.    Respiratory: Negative.     Cardiovascular: Negative.    Musculoskeletal: Negative.    Neurological: Negative.    Hematological: Negative.    Psychiatric/Behavioral: Negative.     All other systems reviewed and are negative.        /80 (BP Location: Left arm, Patient Position: Sitting, Cuff  "Size: Standard)   Pulse 68   Resp 16   Ht 5' 11\" (1.803 m)   Wt 93.4 kg (206 lb)   SpO2 98%   BMI 28.73 kg/m²     Physical Exam:  Physical Exam  Vitals and nursing note reviewed. Exam conducted with a chaperone present.   Constitutional:       Appearance: Normal appearance.   HENT:      Head: Normocephalic and atraumatic.   Cardiovascular:      Rate and Rhythm: Normal rate and regular rhythm.      Pulses: Normal pulses.      Heart sounds: Normal heart sounds.   Musculoskeletal:         General: Normal range of motion.      Cervical back: Normal range of motion and neck supple.   Skin:     General: Skin is warm and dry.   Neurological:      General: No focal deficit present.      Mental Status: He is alert and oriented to person, place, and time.   Psychiatric:         Mood and Affect: Mood normal.         Behavior: Behavior normal.         Thought Content: Thought content normal.         Judgment: Judgment normal.         "

## 2024-01-15 NOTE — PATIENT INSTRUCTIONS
Continue medications.  Report any symptoms.  Report any bleeding.  Unfortunately patient has no insurance and therefore would not be able to attend cardiac rehab.  All questions answered.  Reportedly bleeding.  Continue with cardiac diet.  Advised to avoid risky behaviors including ladders, chainsaws, shoveling snow.  Follow-up with PCP.  Plan to stay on Plavix and aspirin for the next 1 year without interruption.  Continue all medications. Previous studies reviewed with patient, medications reviewed and possible side effects discussed. Continue risk factor modification. Optimize weight, regular exercise and follow up with appropriate specialists and primary care physician as discussed.  All questions answered. Patient advised to report any problems prompting to medical attention. Return for follow up visit in 4 months or earlier if needed

## 2024-01-22 ENCOUNTER — TELEPHONE (OUTPATIENT)
Dept: CARDIOLOGY CLINIC | Facility: CLINIC | Age: 59
End: 2024-01-22

## 2024-01-22 NOTE — TELEPHONE ENCOUNTER
Spoke with pt, pt stated that he had a stent procedure with  on 1/5/24.    Patient stated that he is experiencing pain across his shoulders.    Patient also stated that the top of his chest is soar from since yesterday.     No other symptoms.     Please advise

## 2024-02-02 ENCOUNTER — TELEPHONE (OUTPATIENT)
Dept: CARDIOLOGY CLINIC | Facility: CLINIC | Age: 59
End: 2024-02-02

## 2024-02-02 DIAGNOSIS — I21.4 NSTEMI (NON-ST ELEVATED MYOCARDIAL INFARCTION) (HCC): ICD-10-CM

## 2024-02-02 NOTE — TELEPHONE ENCOUNTER
Spoke with patient mother, pt mother stated that pt has been experiencing lightheadedness and dizziness.    No other symptoms.     Patient BP's from this morning 02/02/24:    BP- 140/104, 170/92, 168/84    Pt has been taking his BP medications every morning.       Please advise

## 2024-02-04 ENCOUNTER — NURSE TRIAGE (OUTPATIENT)
Dept: OTHER | Facility: OTHER | Age: 59
End: 2024-02-04

## 2024-02-04 NOTE — TELEPHONE ENCOUNTER
"Regarding: High BP/ Possible medication reaction  ----- Message from Ashley Biswas sent at 2/4/2024 11:18 AM EST -----  \" My blood pressure has been high ( 160/94 , 163/97) and my hands and feet are itchy. I am not sure if I am having a reaction to one of my medications\"    "

## 2024-02-04 NOTE — TELEPHONE ENCOUNTER
"Reason for Disposition  • [1] Taking BP medications AND [2] feels is having side effects (e.g., impotence, cough, dizzy upon standing)    Answer Assessment - Initial Assessment Questions  1. BLOOD PRESSURE: \"What is the blood pressure?\" \"Did you take at least two measurements 5 minutes apart?\"      147/91 R arm 160/94 L arm    2. ONSET: \"When did you take your blood pressure?\"      About 1 hour ago    3. HOW: \"How did you obtain the blood pressure?\" (e.g., visiting nurse, automatic home BP monitor)      Automatic home BP monitor (arm)    4. HISTORY: \"Do you have a history of high blood pressure?\"      Yes    5. MEDICATIONS: \"Are you taking any medications for blood pressure?\" \"Have you missed any doses recently?\"      Pt on metoprolol, no missed doses    6. OTHER SYMPTOMS: \"Do you have any symptoms?\" (e.g., headache, chest pain, blurred vision, difficulty breathing, weakness)      Itching on top of hands last 3 days    Protocols used: Blood Pressure - High-ADULT-AH    "

## 2024-02-05 ENCOUNTER — TELEPHONE (OUTPATIENT)
Dept: CARDIOLOGY CLINIC | Facility: CLINIC | Age: 59
End: 2024-02-05

## 2024-02-05 NOTE — TELEPHONE ENCOUNTER
CHART MARKED FOR MERGE    Patient Adryan [phone] contacting office experiencing elevated /91 in addition to developing a rash on his hands and feet.    Patient thinks it may be from the metoprolol.

## 2024-02-05 NOTE — TELEPHONE ENCOUNTER
Spoke with pt and Dr. Dial's message was relayed.     Pt stated that he is concerned with his BP. Pt thinks that since he started taking Metoprolol his BP has been higher than before. Also, causing irregular bowel movement.    Pt also stated that he has been experiencing itchy hands and feet 3 days ago and wants to know if it is the metoprolol.    Pt wants to know if he should be taking metoprolol succ or metoprolol tartrate.    Pt's pharmacy refilled metoprolol tartrate    Please advise,    Thanks!

## 2024-02-05 NOTE — TELEPHONE ENCOUNTER
Spoke with patient he has to call when he home so he can look at the bottle because he confused with names so when patient get home he will call

## 2024-02-07 NOTE — TELEPHONE ENCOUNTER
Patient Jaime contacting office 2nd time [phone] experiencing rash on top of feet, hands, belt line, tops of knees and seems to be worse in the evening.      Patient currently taking metoprolol XL 25 mg and he seems to think its a reaction from the med.         Please advise, pt stated that his rash is getting worse.

## 2024-02-07 NOTE — TELEPHONE ENCOUNTER
Patient Adryan contacting office 2nd time [phone] experiencing rash on top of feet, hands, belt line, tops of knees and seems to be worse in the evening.     Patient currently taking metoprolol XL 25 mg and he seems to think its a reaction from the med.

## 2024-02-07 NOTE — TELEPHONE ENCOUNTER
"Spoke with patient he verbally understood advise about med\"s and [facility] then he will give use a call if nothing changes   "

## 2024-02-10 ENCOUNTER — APPOINTMENT (EMERGENCY)
Dept: RADIOLOGY | Facility: HOSPITAL | Age: 59
End: 2024-02-10
Payer: COMMERCIAL

## 2024-02-10 ENCOUNTER — HOSPITAL ENCOUNTER (EMERGENCY)
Facility: HOSPITAL | Age: 59
Discharge: HOME | End: 2024-02-10
Attending: EMERGENCY MEDICINE
Payer: COMMERCIAL

## 2024-02-10 VITALS
TEMPERATURE: 97.5 F | BODY MASS INDEX: 30.1 KG/M2 | RESPIRATION RATE: 20 BRPM | HEIGHT: 71 IN | DIASTOLIC BLOOD PRESSURE: 81 MMHG | WEIGHT: 215 LBS | OXYGEN SATURATION: 95 % | HEART RATE: 77 BPM | SYSTOLIC BLOOD PRESSURE: 145 MMHG

## 2024-02-10 DIAGNOSIS — T78.40XA ALLERGIC REACTION, INITIAL ENCOUNTER: ICD-10-CM

## 2024-02-10 DIAGNOSIS — I10 HYPERTENSION: Primary | ICD-10-CM

## 2024-02-10 LAB
2HR DELTA HS TROPONIN: -1 NG/L
ALBUMIN SERPL BCP-MCNC: 4.7 G/DL (ref 3.5–5)
ALP SERPL-CCNC: 108 U/L (ref 34–104)
ALT SERPL W P-5'-P-CCNC: 53 U/L (ref 7–52)
ANION GAP SERPL CALCULATED.3IONS-SCNC: 7 MMOL/L
AST SERPL W P-5'-P-CCNC: 28 U/L (ref 13–39)
BASOPHILS # BLD AUTO: 0.06 THOUSANDS/ÂΜL (ref 0–0.1)
BASOPHILS NFR BLD AUTO: 1 % (ref 0–1)
BILIRUB SERPL-MCNC: 0.35 MG/DL (ref 0.2–1)
BUN SERPL-MCNC: 21 MG/DL (ref 5–25)
CALCIUM SERPL-MCNC: 9.5 MG/DL (ref 8.4–10.2)
CARDIAC TROPONIN I PNL SERPL HS: 5 NG/L
CARDIAC TROPONIN I PNL SERPL HS: 6 NG/L
CHLORIDE SERPL-SCNC: 105 MMOL/L (ref 96–108)
CO2 SERPL-SCNC: 27 MMOL/L (ref 21–32)
CREAT SERPL-MCNC: 1.08 MG/DL (ref 0.6–1.3)
EOSINOPHIL # BLD AUTO: 0.29 THOUSAND/ÂΜL (ref 0–0.61)
EOSINOPHIL NFR BLD AUTO: 4 % (ref 0–6)
ERYTHROCYTE [DISTWIDTH] IN BLOOD BY AUTOMATED COUNT: 12.5 % (ref 11.6–15.1)
GFR SERPL CREATININE-BSD FRML MDRD: 74 ML/MIN/1.73SQ M
GLUCOSE SERPL-MCNC: 120 MG/DL (ref 65–140)
HCT VFR BLD AUTO: 43.8 % (ref 36.5–49.3)
HGB BLD-MCNC: 14.6 G/DL (ref 12–17)
IMM GRANULOCYTES # BLD AUTO: 0.02 THOUSAND/UL (ref 0–0.2)
IMM GRANULOCYTES NFR BLD AUTO: 0 % (ref 0–2)
LYMPHOCYTES # BLD AUTO: 2.51 THOUSANDS/ÂΜL (ref 0.6–4.47)
LYMPHOCYTES NFR BLD AUTO: 34 % (ref 14–44)
MCH RBC QN AUTO: 28.7 PG (ref 26.8–34.3)
MCHC RBC AUTO-ENTMCNC: 33.3 G/DL (ref 31.4–37.4)
MCV RBC AUTO: 86 FL (ref 82–98)
MONOCYTES # BLD AUTO: 0.93 THOUSAND/ÂΜL (ref 0.17–1.22)
MONOCYTES NFR BLD AUTO: 13 % (ref 4–12)
NEUTROPHILS # BLD AUTO: 3.63 THOUSANDS/ÂΜL (ref 1.85–7.62)
NEUTS SEG NFR BLD AUTO: 48 % (ref 43–75)
NRBC BLD AUTO-RTO: 0 /100 WBCS
PLATELET # BLD AUTO: 291 THOUSANDS/UL (ref 149–390)
PMV BLD AUTO: 9.1 FL (ref 8.9–12.7)
POTASSIUM SERPL-SCNC: 4.2 MMOL/L (ref 3.5–5.3)
PROT SERPL-MCNC: 7.4 G/DL (ref 6.4–8.4)
RBC # BLD AUTO: 5.08 MILLION/UL (ref 3.88–5.62)
SODIUM SERPL-SCNC: 139 MMOL/L (ref 135–147)
WBC # BLD AUTO: 7.44 THOUSAND/UL (ref 4.31–10.16)

## 2024-02-10 PROCEDURE — 80053 COMPREHEN METABOLIC PANEL: CPT | Performed by: EMERGENCY MEDICINE

## 2024-02-10 PROCEDURE — 85025 COMPLETE CBC W/AUTO DIFF WBC: CPT | Performed by: EMERGENCY MEDICINE

## 2024-02-10 PROCEDURE — 99285 EMERGENCY DEPT VISIT HI MDM: CPT | Performed by: EMERGENCY MEDICINE

## 2024-02-10 PROCEDURE — 71045 X-RAY EXAM CHEST 1 VIEW: CPT

## 2024-02-10 PROCEDURE — 84484 ASSAY OF TROPONIN QUANT: CPT | Performed by: EMERGENCY MEDICINE

## 2024-02-10 PROCEDURE — 36415 COLL VENOUS BLD VENIPUNCTURE: CPT | Performed by: EMERGENCY MEDICINE

## 2024-02-10 PROCEDURE — 93005 ELECTROCARDIOGRAM TRACING: CPT

## 2024-02-10 PROCEDURE — 99285 EMERGENCY DEPT VISIT HI MDM: CPT

## 2024-02-10 RX ORDER — SODIUM CHLORIDE 9 MG/ML
3 INJECTION INTRAVENOUS
Status: DISCONTINUED | OUTPATIENT
Start: 2024-02-10 | End: 2024-02-11 | Stop reason: HOSPADM

## 2024-02-10 RX ORDER — HYDROXYZINE HYDROCHLORIDE 25 MG/1
25 TABLET, FILM COATED ORAL EVERY 6 HOURS PRN
Qty: 30 TABLET | Refills: 0 | Status: SHIPPED | OUTPATIENT
Start: 2024-02-10

## 2024-02-10 RX ORDER — HYDROXYZINE HYDROCHLORIDE 25 MG/1
25 TABLET, FILM COATED ORAL ONCE
Status: COMPLETED | OUTPATIENT
Start: 2024-02-10 | End: 2024-02-10

## 2024-02-10 RX ADMIN — NITROGLYCERIN 1 INCH: 20 OINTMENT TOPICAL at 19:13

## 2024-02-10 RX ADMIN — HYDROXYZINE HYDROCHLORIDE 25 MG: 25 TABLET, FILM COATED ORAL at 23:01

## 2024-02-10 NOTE — ED PROVIDER NOTES
"History  Chief Complaint   Patient presents with    Hypertension     Pt states he recently had cardiac stents placed and was told to stop his BP meds, pt c/o all over itchiness x 3 days, \"I feel weird, it was worse yesterday but it's still bad\"       Hypertension      None       Past Medical History:   Diagnosis Date    MI (myocardial infarction) (HCC)        History reviewed. No pertinent surgical history.    History reviewed. No pertinent family history.  I have reviewed and agree with the history as documented.    E-Cigarette/Vaping     E-Cigarette/Vaping Substances     Social History     Tobacco Use    Smoking status: Never    Smokeless tobacco: Never       Review of Systems    Physical Exam  Physical Exam    Vital Signs  ED Triage Vitals [02/10/24 1829]   Temperature Pulse Respirations Blood Pressure SpO2   97.5 °F (36.4 °C) 77 18 162/97 94 %      Temp Source Heart Rate Source Patient Position - Orthostatic VS BP Location FiO2 (%)   Oral Monitor Sitting Left arm --      Pain Score       --           Vitals:    02/10/24 1829   BP: 162/97   Pulse: 77   Patient Position - Orthostatic VS: Sitting         Visual Acuity      ED Medications  Medications - No data to display    Diagnostic Studies  Results Reviewed       None                   No orders to display              Procedures  Procedures         ED Course                                             Medical Decision Making           Disposition  Final diagnoses:   None     ED Disposition       None          Follow-up Information    None         Patient's Medications    No medications on file       No discharge procedures on file.    PDMP Review       None            ED Provider  Electronically Signed by          "     E-Cigarette/Vaping Substances     Social History     Tobacco Use    Smoking status: Never    Smokeless tobacco: Never   Vaping Use    Vaping status: Never Used   Substance Use Topics    Alcohol use: Never    Drug use: Never       Review of Systems   Constitutional:  Negative for chills and fever.   Respiratory:  Negative for chest tightness and shortness of breath.    Cardiovascular:  Negative for chest pain, palpitations and leg swelling.   Gastrointestinal:  Negative for abdominal pain, nausea and vomiting.   Musculoskeletal:  Negative for back pain and neck pain.   Skin:  Negative for wound.        Diffuse pruritus   Neurological:  Negative for dizziness and headaches.       Physical Exam  Physical Exam  Vitals reviewed.   Constitutional:       General: He is not in acute distress.     Appearance: He is well-developed. He is not diaphoretic.   HENT:      Head: Normocephalic and atraumatic.   Eyes:      General: No scleral icterus.        Right eye: No discharge.         Left eye: No discharge.      Conjunctiva/sclera: Conjunctivae normal.      Pupils: Pupils are equal, round, and reactive to light.   Neck:      Vascular: No JVD.   Cardiovascular:      Rate and Rhythm: Normal rate and regular rhythm.      Heart sounds: Normal heart sounds. No murmur heard.     No friction rub. No gallop.   Pulmonary:      Effort: Pulmonary effort is normal. No respiratory distress.      Breath sounds: Normal breath sounds. No wheezing or rales.   Chest:      Chest wall: No tenderness.   Abdominal:      General: Bowel sounds are normal. There is no distension.      Palpations: Abdomen is soft.      Tenderness: There is no abdominal tenderness. There is no guarding.   Musculoskeletal:         General: No tenderness or deformity. Normal range of motion.      Cervical back: Normal range of motion and neck supple.   Skin:     General: Skin is warm and dry.      Coloration: Skin is not pale.      Findings: No erythema or rash.       Comments: Diffuse pruritus   Neurological:      Mental Status: He is alert and oriented to person, place, and time.      Cranial Nerves: No cranial nerve deficit.   Psychiatric:         Behavior: Behavior normal.         Vital Signs  ED Triage Vitals [02/10/24 1829]   Temperature Pulse Respirations Blood Pressure SpO2   97.5 °F (36.4 °C) 77 18 162/97 94 %      Temp Source Heart Rate Source Patient Position - Orthostatic VS BP Location FiO2 (%)   Oral Monitor Sitting Left arm --      Pain Score       --           Vitals:    02/10/24 2030 02/10/24 2100 02/10/24 2200 02/10/24 2230   BP: 123/83 126/80 125/78 145/81   Pulse: 76 75 76 77   Patient Position - Orthostatic VS: Lying Lying Lying Lying         Visual Acuity      ED Medications  Medications   nitroglycerin (NITRO-BID) 2 % TD ointment 1 inch (1 inch Topical Given 2/10/24 1913)   hydrOXYzine HCL (ATARAX) tablet 25 mg (25 mg Oral Given 2/10/24 2301)       Diagnostic Studies  Results Reviewed       Procedure Component Value Units Date/Time    HS Troponin I 2hr [[id number]]  (Normal) Collected: 02/10/24 2131    Lab Status: Final result Specimen: Blood from Arm, Right Updated: 02/10/24 2210     hs TnI 2hr 5 ng/L      Delta 2hr hsTnI -1 ng/L     HS Troponin 0hr (reflex protocol) [[id number]]  (Normal) Collected: 02/10/24 1920    Lab Status: Final result Specimen: Blood from Arm, Right Updated: 02/10/24 1956     hs TnI 0hr 6 ng/L     Comprehensive metabolic panel [[id number]]  (Abnormal) Collected: 02/10/24 1920    Lab Status: Final result Specimen: Blood from Arm, Right Updated: 02/10/24 1952     Sodium 139 mmol/L      Potassium 4.2 mmol/L      Chloride 105 mmol/L      CO2 27 mmol/L      ANION GAP 7 mmol/L      BUN 21 mg/dL      Creatinine 1.08 mg/dL      Glucose 120 mg/dL      Calcium 9.5 mg/dL      AST 28 U/L      ALT 53 U/L      Alkaline Phosphatase 108 U/L      Total Protein 7.4 g/dL      Albumin 4.7 g/dL      Total Bilirubin 0.35 mg/dL      eGFR 74 ml/min/1.73sq m      Narrative:      National Kidney Disease Foundation guidelines for Chronic Kidney Disease (CKD):     Stage 1 with normal or high GFR (GFR > 90 mL/min/1.73 square meters)    Stage 2 Mild CKD (GFR = 60-89 mL/min/1.73 square meters)    Stage 3A Moderate CKD (GFR = 45-59 mL/min/1.73 square meters)    Stage 3B Moderate CKD (GFR = 30-44 mL/min/1.73 square meters)    Stage 4 Severe CKD (GFR = 15-29 mL/min/1.73 square meters)    Stage 5 End Stage CKD (GFR <15 mL/min/1.73 square meters)  Note: GFR calculation is accurate only with a steady state creatinine    CBC and differential [[id number]]  (Abnormal) Collected: 02/10/24 1920    Lab Status: Final result Specimen: Blood from Arm, Right Updated: 02/10/24 1932     WBC 7.44 Thousand/uL      RBC 5.08 Million/uL      Hemoglobin 14.6 g/dL      Hematocrit 43.8 %      MCV 86 fL      MCH 28.7 pg      MCHC 33.3 g/dL      RDW 12.5 %      MPV 9.1 fL      Platelets 291 Thousands/uL      nRBC 0 /100 WBCs      Neutrophils Relative 48 %      Immat GRANS % 0 %      Lymphocytes Relative 34 %      Monocytes Relative 13 %      Eosinophils Relative 4 %      Basophils Relative 1 %      Neutrophils Absolute 3.63 Thousands/µL      Immature Grans Absolute 0.02 Thousand/uL      Lymphocytes Absolute 2.51 Thousands/µL      Monocytes Absolute 0.93 Thousand/µL      Eosinophils Absolute 0.29 Thousand/µL      Basophils Absolute 0.06 Thousands/µL                    X-ray chest 1 view portable   ED Interpretation by Nevaeh Duval DO (02/10 2117)   No acute cardiopulmonary abnormalities      Final Result by Melissa Blount MD (02/11 1440)      No acute cardiopulmonary disease.            Workstation performed: [id number]                    Procedures  Procedures         ED Course  ED Course as of 03/02/24 0158   Sat Feb 10, 2024   2218 Delta 2hr hsTnI: -1                               SBIRT 22yo+      Flowsheet Row Most Recent Value   Initial Alcohol Screen: US AUDIT-C     1. How often do  you have a drink containing alcohol? 0 Filed at: 02/10/2024 1851   2. How many drinks containing alcohol do you have on a typical day you are drinking?  0 Filed at: 02/10/2024 1851   3a. Male UNDER 65: How often do you have five or more drinks on one occasion? 0 Filed at: 02/10/2024 1851   3b. FEMALE Any Age, or MALE 65+: How often do you have 4 or more drinks on one occassion? 0 Filed at: 02/10/2024 1851   Audit-C Score 0 Filed at: 02/10/2024 1851   CHARLIE: How many times in the past year have you...    Used an illegal drug or used a prescription medication for non-medical reasons? Never Filed at: 02/10/2024 1851                      Medical Decision Making  Allergic reaction.  Patient was given Atarax for symptomatic treatment.  Patient is advised to resume his hypertensive medication.  He is advised that he is more likely having a reaction to the statin.  He is advised to discuss his medications with his cardiology team and is given Atarax for symptomatic treatment in the meantime.  Advised return precaution if worsening condition, repeat episode of cardiac disease, signs of worsening allergic reaction.    Amount and/or Complexity of Data Reviewed  Labs: ordered. Decision-making details documented in ED Course.  Radiology: ordered and independent interpretation performed.    Risk  Prescription drug management.             Disposition  Final diagnoses:   Hypertension   Allergic reaction, initial encounter     Time reflects when diagnosis was documented in both MDM as applicable and the Disposition within this note       Time User Action Codes Description Comment    2/10/2024 10:39 PM Nevaeh Duval Add [I10] Hypertension     2/10/2024 10:49 PM Nevaeh Duval Add [T78.40XA] Allergic reaction, initial encounter           ED Disposition       ED Disposition   Discharge    Condition   Stable    Date/Time   Sat Feb 10, 2024 2239    Comment   Adyran Agee discharge to home/self care.                    Follow-up Information       Follow up With Specialties Details Why Contact Info Additional Information    [facility] Emergency Medicine  If symptoms worsen [address] [facility] [id number] [phone] [facility], [address] [place], [place], [address]    Ana Lilia Johnson MD Cardiology Schedule an appointment as soon as possible for a visit  For follow up to ensure improvement, and for further testing and treatment as needed [address] [facility] [address] [phone]               Discharge Medication List as of 2/10/2024 10:53 PM        START taking these medications    Details   hydrOXYzine HCL (ATARAX) 25 mg tablet Take 1 tablet (25 mg total) by mouth every 6 (six) hours as needed for itching, Starting Sat 2/10/2024, Normal             No discharge procedures on file.    PDMP Review       None            ED Provider  Electronically Signed by             Nevaeh Duval DO  03/02/24 0158

## 2024-02-11 LAB
ATRIAL RATE: 74 BPM
ATRIAL RATE: 87 BPM
P AXIS: 58 DEGREES
P AXIS: 74 DEGREES
PR INTERVAL: 162 MS
PR INTERVAL: 174 MS
QRS AXIS: 31 DEGREES
QRS AXIS: 44 DEGREES
QRSD INTERVAL: 80 MS
QRSD INTERVAL: 82 MS
QT INTERVAL: 360 MS
QT INTERVAL: 386 MS
QTC INTERVAL: 428 MS
QTC INTERVAL: 433 MS
T WAVE AXIS: 30 DEGREES
T WAVE AXIS: 62 DEGREES
VENTRICULAR RATE: 74 BPM
VENTRICULAR RATE: 87 BPM

## 2024-02-11 PROCEDURE — 93010 ELECTROCARDIOGRAM REPORT: CPT | Performed by: INTERNAL MEDICINE

## 2024-02-11 NOTE — DISCHARGE INSTRUCTIONS
Please restart your metoprolol.   Follow up w cardiology regarding trial of medications to check for allergy.   Use atarax as needed for itching

## 2024-02-12 ENCOUNTER — TELEPHONE (OUTPATIENT)
Dept: CARDIOLOGY CLINIC | Facility: CLINIC | Age: 59
End: 2024-02-12

## 2024-02-12 NOTE — TELEPHONE ENCOUNTER
Patient Adryan [phone] contacting office just seen in the ER experiencing chest tightness and BP running high.    Hospital staff advised patient to start taking the metoprolol again and stop the atorvastatin.    Patient was experiencing itching and rash being on the metoprolol and is concerned this will happen again.     Appointment was offered to patient with Caridad this week, he declined.

## 2024-02-12 NOTE — TELEPHONE ENCOUNTER
Patient Adryan [phone] contacting office just seen in the ER experiencing chest tightness and BP running high.     Hospital staff advised patient to start taking the metoprolol again and stop the atorvastatin.     Patient was experiencing itching and rash being on the metoprolol and is concerned this will happen again.      Appointment was offered to patient with Caridad this week, he declined.

## 2024-02-12 NOTE — TELEPHONE ENCOUNTER
Patient went to ED for a rash and itching... they took him off Plavix and started back on metoprolol. Please advise this is ok? [phone]

## 2024-02-13 NOTE — TELEPHONE ENCOUNTER
Patient has been prescribed these medications for about a month now. Can attempt to restart the metoprolol and atorvastatin one at a time (ie. Start metoprolol for a week, then restart atorvastatin). If symptoms recur with the metoprolol, we can consider a different medication such as amlodipine.

## 2024-02-13 NOTE — TELEPHONE ENCOUNTER
Please advise on pt's metoprolol concern in regards to allergic reaction and discontinuation of atorvastatin. Pt is prescribed plavix 75 mg.

## 2024-02-13 NOTE — TELEPHONE ENCOUNTER
S/w pt. Pt states iching has stopped while taking metoprolol. Pt advised to continue Plavix per Dr. Combs and Kavya GRANDA's recommendation. Pt will be seen in office this week by Caridad GARCIA to review medications further and determine source of allergic reaction.

## 2024-02-16 ENCOUNTER — OFFICE VISIT (OUTPATIENT)
Dept: CARDIOLOGY CLINIC | Facility: CLINIC | Age: 59
End: 2024-02-16

## 2024-02-16 VITALS
OXYGEN SATURATION: 97 % | BODY MASS INDEX: 29.68 KG/M2 | HEIGHT: 71 IN | DIASTOLIC BLOOD PRESSURE: 98 MMHG | RESPIRATION RATE: 16 BRPM | HEART RATE: 71 BPM | SYSTOLIC BLOOD PRESSURE: 138 MMHG | WEIGHT: 212 LBS

## 2024-02-16 DIAGNOSIS — I10 PRIMARY HYPERTENSION: ICD-10-CM

## 2024-02-16 DIAGNOSIS — L29.9 PRURITUS: ICD-10-CM

## 2024-02-16 DIAGNOSIS — I25.10 CORONARY ARTERY DISEASE INVOLVING NATIVE HEART WITHOUT ANGINA PECTORIS, UNSPECIFIED VESSEL OR LESION TYPE: Primary | ICD-10-CM

## 2024-02-16 DIAGNOSIS — E78.00 PURE HYPERCHOLESTEROLEMIA: ICD-10-CM

## 2024-02-16 PROCEDURE — 99214 OFFICE O/P EST MOD 30 MIN: CPT

## 2024-02-16 NOTE — PATIENT INSTRUCTIONS
Stop atorvastatin for 1 week to see if the itching resolves. If itching does not resolve, restart atorvastatin in 1 week. If itching resolves, call office to discuss alternative medications.     Continue metoprolol, aspirin, and plavix.

## 2024-02-16 NOTE — PROGRESS NOTES
[facility] [place]  [address]  [place] [address]  Cardiology Office Note    Adryan Agee 58 y.o. male MRN: [id number]    02/16/24          Assessment/Plan:  1. Pruritus  Atypical presentation for medication sensitivity as patient's symptoms started 1 mo post PCI  No visible hives present.  No change in symptoms when he held metoprolol.  Will trial holding atorvastatin for 1 week. If no change in symptoms, patient advised to resume. If symptoms resolve, patient advised to notify office after 1 week.  Recommend close follow-up with PCP for alternate causes.  Patient endorses significant stress and anxiety which may also be contributing factors.     2. CAD s/p recent STEMI with kissing stents to OM2 and mCx  Patient denies chest pain or anginal equivalent  Continue DAPT: aspirin and plavix  Continue metoprolol; trial holding atorvastatin for 1 week as described above.    3. Hypertension  Continue metoprolol succinate 25 mg daily    4. Hyperlipidemia  Hold atorvastatin for 1 week to evaluate symptoms.  See plan above      Follow up: 2 months or sooner as needed  All questions and concerns addressed.  Patient was advised to report any problems requiring medical attention.          1. Coronary artery disease involving native heart without angina pectoris, unspecified vessel or lesion type        2. Primary hypertension        3. Pure hypercholesterolemia            HPI: dAryan Agee is a 58 y.o. year old male with PMH of CAD s/p STEMI, HTN , HLD  who presents for medication concerns.     Patient notes that about 2 weeks ago, patient started experiencing widespread itching. No rash noted but patient states that his itching so much that he went to the ED for further evaluation. She was started on hydroxyzine. Patient states that he already trialed being off of metoprolol but did not see a difference in his symptoms. He is concerned that atorvastatin is causing his  symptoms/    Patient notes that he is under a lot of stress at this time. He notes that being out of work has been hard on him. He states that the itching interferes with his ability to work. He states that he lives alone as well.     Itching started about a month after cardiac cath.      Patient was instructed to call the office or seek medical attention if any significant chest pain, shortness of breath, palpitations, or lower extremity swelling develop. All medications reviewed and patient is tolerating medications without side effects.     Social history:   Patient denies tobacco, significant alcohol, or recreational drug use.    EKG- 2/10/24 Normal sinus rhythm        Patient Active Problem List   Diagnosis    Chest pain, unspecified    Elevated troponin    Mixed hyperlipidemia    Cervical spondylolysis    Coronary artery disease involving native heart without angina pectoris    Primary hypertension    Pure hypercholesterolemia       Allergies   Allergen Reactions    Toradol [Ketorolac Tromethamine] Hives    Ketorolac Rash         Current Outpatient Medications:     aspirin 81 mg chewable tablet, Chew 1 tablet (81 mg total) daily, Disp: , Rfl:     atorvastatin (LIPITOR) 80 mg tablet, Take 1 tablet (80 mg total) by mouth daily with dinner, Disp: 30 tablet, Rfl: 0    citalopram (CeleXA) 20 mg tablet, Take 20 mg by mouth daily, Disp: , Rfl:     clopidogrel (PLAVIX) 75 mg tablet, Take 1 tablet (75 mg total) by mouth daily Do not start before January 7, 2024., Disp: 30 tablet, Rfl: 1    diclofenac sodium (VOLTAREN) 50 mg EC tablet, Take 50 mg by mouth 3 (three) times a day as needed, Disp: , Rfl:     hydrOXYzine HCL (ATARAX) 25 mg tablet, Take 1 tablet (25 mg total) by mouth every 6 (six) hours as needed for itching, Disp: 30 tablet, Rfl: 0    metoprolol succinate (TOPROL-XL) 25 mg 24 hr tablet, Take 1 tablet (25 mg total) by mouth daily, Disp: 30 tablet, Rfl: 0    nitroglycerin (NITROSTAT) 0.4 mg SL tablet, Place 1  tablet (0.4 mg total) under the tongue every 5 (five) minutes as needed for chest pain (monitor blood pressure.), Disp: 30 tablet, Rfl: 0    omeprazole (PriLOSEC) 40 MG capsule, Take 40 mg by mouth daily, Disp: , Rfl:     ondansetron (ZOFRAN-ODT) 4 mg disintegrating tablet, Take 1 tablet (4 mg total) by mouth every 6 (six) hours as needed for nausea or vomiting, Disp: 20 tablet, Rfl: 0    Past Medical History:   Diagnosis Date    Hypertension     MI (myocardial infarction) (HCC)     Reflux gastritis        History reviewed. No pertinent family history.    Past Surgical History:   Procedure Laterality Date    CARDIAC CATHETERIZATION N/A 1/5/2024    Procedure: Cardiac catheterization;  Surgeon: Quirino Combs MD;  Location: [facility];  Service: Cardiology    CARDIAC CATHETERIZATION Left 1/5/2024    Procedure: Cardiac Left Heart Cath;  Surgeon: Quirino Combs MD;  Location: [facility];  Service: Cardiology    CARDIAC CATHETERIZATION N/A 1/5/2024    Procedure: Cardiac Coronary Angiogram;  Surgeon: Quirino Combs MD;  Location: [facility];  Service: Cardiology    CARDIAC CATHETERIZATION N/A 1/5/2024    Procedure: Cardiac pci;  Surgeon: Quirino Combs MD;  Location: [facility];  Service: Cardiology    NOSE SURGERY         Social History     Socioeconomic History    Marital status:      Spouse name: Not on file    Number of children: Not on file    Years of education: Not on file    Highest education level: Not on file   Occupational History    Not on file   Tobacco Use    Smoking status: Never    Smokeless tobacco: Never   Vaping Use    Vaping status: Never Used   Substance and Sexual Activity    Alcohol use: Never    Drug use: Never    Sexual activity: Not on file   Other Topics Concern    Not on file   Social History Narrative    ** Merged History Encounter **          Social Determinants of Health     Financial Resource Strain: Not on file   Food Insecurity:  "Not on file   Transportation Needs: Not on file   Physical Activity: Not on file   Stress: Not on file   Social Connections: Not on file   Intimate Partner Violence: Not on file   Housing Stability: Not on file       Review of symptoms:   Review of Systems   Constitutional:  Negative for chills, diaphoresis and fever.   Respiratory:  Negative for cough, chest tightness and shortness of breath.    Cardiovascular:  Negative for chest pain, palpitations and leg swelling.   Gastrointestinal:  Negative for abdominal distention, blood in stool, nausea and vomiting.   Genitourinary:  Negative for difficulty urinating.   Musculoskeletal:  Negative for arthralgias and back pain.   Skin:         Pruritus- generalized   Neurological:  Negative for dizziness, syncope, light-headedness and headaches.   Psychiatric/Behavioral:  Negative for agitation and confusion. The patient is not nervous/anxious.         Vitals: /98 (BP Location: Left arm, Patient Position: Sitting, Cuff Size: Standard)   Pulse 71   Resp 16   Ht 5' 11\" (1.803 m)   Wt 96.2 kg (212 lb)   SpO2 97%   BMI 29.57 kg/m²         Physical Exam:     Physical Exam  Vitals and nursing note reviewed.   Constitutional:       General: He is not in acute distress.     Appearance: He is well-developed.   HENT:      Head: Normocephalic and atraumatic.   Eyes:      Conjunctiva/sclera: Conjunctivae normal.   Neck:      Vascular: No carotid bruit.   Cardiovascular:      Rate and Rhythm: Normal rate and regular rhythm.      Heart sounds: Normal heart sounds. No murmur heard.  Pulmonary:      Effort: Pulmonary effort is normal. No respiratory distress.      Breath sounds: Normal breath sounds.   Abdominal:      Palpations: Abdomen is soft.      Tenderness: There is no abdominal tenderness.   Musculoskeletal:         General: No swelling.      Cervical back: Neck supple.      Right lower leg: No edema.      Left lower leg: No edema.   Skin:     General: Skin is warm and " dry.      Capillary Refill: Capillary refill takes less than 2 seconds.   Neurological:      Mental Status: He is alert and oriented to person, place, and time.   Psychiatric:         Mood and Affect: Mood normal.            Thank you for allowing me to participate in the care and evaluation of your patient.  Should you have any questions, please feel free to contact me.

## 2024-02-19 ENCOUNTER — TELEPHONE (OUTPATIENT)
Dept: CARDIOLOGY CLINIC | Facility: CLINIC | Age: 59
End: 2024-02-19

## 2024-02-19 NOTE — TELEPHONE ENCOUNTER
S/w pt. States that his BP has been elevated since yesterday, and is accompanied by lightheadedness.     Vital signs:   2/18:156/104, HR 92   2/19: 154/97, HR 84    Please advise

## 2024-02-19 NOTE — TELEPHONE ENCOUNTER
Patient Adryan [phone] contacting office had o/v with Caridad on 2/16/24 and was advised to stop taking the atorvastatin.    On Sunday 2/18/24 patient experiencing elevated Bp of 158/104 and HR 82.    Patient states he feels lightheaded also.

## 2024-02-20 NOTE — TELEPHONE ENCOUNTER
Spoke with Caridad GARCIA, Caridad stated due to the itching that pt can stop taking the Atorvastatin 80 mg for the rest of this week.    Caridad GARCIA stated that the itching is not from the Atorvastatin 80 mg. The Hydroxyzine is to help the itching.      Caridad also stated that Patient BP's are fine from the last time pt was in office.    Patient can resume the Atorvastatin 80 mg next week Monday 2/26/24 and to continue taking the Hydroxyzine 25 mg for the itching.    Patient can take both medications together.    Spoke with patient relayed Caridad GARCIA advised, patient verbally understood.

## 2024-02-20 NOTE — TELEPHONE ENCOUNTER
Patient Adryan [phone] contacting office still experiencing elevated HR.    Patient mentioned he made his bed this morning and was very winded.     This is patient's 2nd call

## 2024-02-20 NOTE — TELEPHONE ENCOUNTER
Has the itching resolved since patient stopped the atorvastatin? He has been off for 3-4 days now.     Can patient provide BP at home? His blood pressure and heart rate were fine when I saw him in the office.

## 2024-02-20 NOTE — TELEPHONE ENCOUNTER
Spoke with patient, patient stated that he still is experiencing lightheadedness and SOB.    Patient stated that he stopped taking the Atorvastatin 80 mg due to a allergic reaction from the medication.     Allergic reaction is itching.     Patient stated that he needs a be on a medication for his cholesterol.    Patient would like to know If he can be prescribe a different medication.    Please see Encounter from 2/19/24 regarding pt BP and HR.    Please advise

## 2024-02-27 ENCOUNTER — TELEPHONE (OUTPATIENT)
Dept: CARDIOLOGY CLINIC | Facility: CLINIC | Age: 59
End: 2024-02-27

## 2024-02-27 NOTE — TELEPHONE ENCOUNTER
Pt called and is requesting a call back regarding his atorvastatin medication..       Pt can be reached at [phone]

## 2024-02-28 DIAGNOSIS — I10 PRIMARY HYPERTENSION: Primary | ICD-10-CM

## 2024-02-28 RX ORDER — LISINOPRIL 5 MG/1
5 TABLET ORAL DAILY
Qty: 30 TABLET | Refills: 6 | Status: SHIPPED | OUTPATIENT
Start: 2024-02-28

## 2024-02-28 NOTE — TELEPHONE ENCOUNTER
Spoke with patient, pt stated that he started taking the Atorvastatin 80 mg daily and his Metoprolol succinate 25 mg daily on 1/6/24.    Patient BP readings:    2/21/24- 141/85  HR-75    1 HR later- 14/86 HR-75      2/22/24- 141/86 HR-75    1 HR later- 151/90 HR- 69       2/23/24- 151/90  HR-69    1 HR later- 153/90 HR-73      2/24/24- 158/95  HR- 61    1 HR later- 142//94  HR- 77      2/25/24- 143/94        2/26/24- 141/90  HR- 73    1 HR later- 141/87  HR- 73      2/27/24- 142/87  HR- 77    2/28/24- 140/86  HR- 80      Patient stated that he feels winded, and heaviness in chest.       Please advise

## 2024-03-04 NOTE — TELEPHONE ENCOUNTER
Patient is not happy that he needs to see his PCP when it is regarding his blood pressure. He states that his PCP is 1 hour and 20 minutes away one way. Patient would like to get back on track regarding his blood pressure. He has been feeling dizzy and lightheaded and his heart rate has been 101 to 104.      Please advise.     [phone]

## 2024-03-04 NOTE — TELEPHONE ENCOUNTER
Call transferred.    Pt stated since starting medication Lisinopril 5 mg daily, he feels lightheaded and dizzy.  Pt felt exhausted over the weekend.   Medication was reviewed with Pt. Pt on same medication regimen.    BP readings:  3/1/24 140/86 HR 80  3/2/24 151/94 HR 72  3/3/24 146/93   3/4/24 141/86 HR 85    I advised pt if symptoms worsen, to proceed to ED. Pt verbalized understanding.

## 2024-03-04 NOTE — TELEPHONE ENCOUNTER
Left message advising patient as per Dr. Combs to schedule an appointment with his PCP to address his HTN.

## 2024-03-06 LAB
ATRIAL RATE: 48 BPM
ATRIAL RATE: 49 BPM
ATRIAL RATE: 65 BPM
ATRIAL RATE: 70 BPM
P AXIS: 44 DEGREES
P AXIS: 46 DEGREES
P AXIS: 57 DEGREES
P AXIS: 67 DEGREES
PR INTERVAL: 172 MS
PR INTERVAL: 174 MS
PR INTERVAL: 174 MS
PR INTERVAL: 178 MS
QRS AXIS: 11 DEGREES
QRS AXIS: 20 DEGREES
QRS AXIS: 21 DEGREES
QRS AXIS: 48 DEGREES
QRSD INTERVAL: 78 MS
QRSD INTERVAL: 78 MS
QRSD INTERVAL: 82 MS
QRSD INTERVAL: 84 MS
QT INTERVAL: 388 MS
QT INTERVAL: 402 MS
QT INTERVAL: 436 MS
QT INTERVAL: 454 MS
QTC INTERVAL: 389 MS
QTC INTERVAL: 410 MS
QTC INTERVAL: 418 MS
QTC INTERVAL: 419 MS
T WAVE AXIS: 12 DEGREES
T WAVE AXIS: 20 DEGREES
T WAVE AXIS: 44 DEGREES
T WAVE AXIS: 9 DEGREES
VENTRICULAR RATE: 48 BPM
VENTRICULAR RATE: 49 BPM
VENTRICULAR RATE: 65 BPM
VENTRICULAR RATE: 70 BPM

## 2024-03-10 NOTE — PROGRESS NOTES
[facility] Cardiology   Office Visit    Adryan Agee 58 y.o. male MRN: [id number]    03/11/24        Assessment/Plan:  1.  CAD s/p recent STEMI with kissing stents to OM2 and mCx (1/5/2024)  Denies chest pain.  Continue ASA, Plavix, atorvastatin, and Toprol-XL.  Advised to notify our office for any new/worsening symptoms.    2.  Hypertension  BP is currently well controlled, was previously elevated, however improved with lisinopril 5 mg daily.  Unfortunately, patient developed cough as adverse effect.  Discontinue lisinopril and start losartan 25 mg daily.  Continue Toprol-XL.  Encourage ambulatory monitoring and low-sodium diet.  Advised to notify our office for persistently abnormal readings.    3.  Hyperlipidemia  Continue atorvastatin and dietary control.  Recommend periodic surveillance.     4.  T2DM, A1C 6.6        HPI: Adryan Agee is a 58 y.o. year old male with history of CAD, hypertension, hyperlipidemia, and T2DM who presents for office visit.  Since last office visit with cardiology patient was experiencing elevated blood pressures and feelings of lightheadedness/fatigue.  He called office with complaints of such and was prescribed lisinopril.  Shortly after starting lisinopril patient noticed improvement of blood pressures and symptoms.  However, he unfortunately developed an adverse effect of dry cough.  Denies any complaints at this time otherwise.  Endorses strict compliance to all medications.  Excluding cough, patient denies additional adverse reactions to medications.  He tries to maintain a low-sodium diet.  Patient monitors blood pressure on a routine basis which has been well-controlled since initiating lisinopril.  Denies chest pain, SOB, LE edema, palpitations, or syncope.      Cardiovascular imaging:   Cardiac catheterization (1/5/2024) - 75% stenosis of first marginal, 100% stenosis of proximal to mid Cx, s/p successful kissing SAVANNAH to the OM 2 and mid Cx.    TTE (1/5/2024) - overall  "unremarkable with EF 55%      Review of Systems:  Review of Systems   Constitutional:  Negative for chills and fever.   HENT:  Negative for ear pain and sore throat.    Eyes:  Negative for pain and visual disturbance.   Respiratory:  Positive for cough. Negative for shortness of breath.    Cardiovascular:  Negative for chest pain, palpitations and leg swelling.   Gastrointestinal:  Negative for abdominal pain and vomiting.   Genitourinary:  Negative for dysuria and hematuria.   Musculoskeletal:  Negative for arthralgias and back pain.   Skin:  Negative for color change and rash.   Neurological:  Negative for seizures and syncope.   All other systems reviewed and are negative.      PHYSICAL EXAM:  Vitals:   Vitals:    03/11/24 0830   BP: 126/84   BP Location: Left arm   Patient Position: Sitting   Cuff Size: Standard   Pulse: 74   Resp: 16   SpO2: 96%   Weight: 97.1 kg (214 lb)   Height: 5' 11\" (1.803 m)        Physical Exam:  GEN: Alert and oriented x 3, in no acute distress.  Well appearing and well nourished.   HEENT: Sclera anicteric, conjunctivae pink, mucous membranes moist. Oropharynx clear.   NECK: Supple, no carotid bruits, no significant JVD. Trachea midline, no thyromegaly.   HEART: Regular rhythm, normal S1 and S2, no murmurs, clicks, gallops or rubs. PMI nondisplaced, no thrills.   LUNGS: Clear to auscultation bilaterally; no wheezes, rales, or rhonchi. No increased work of breathing or signs of respiratory distress.   ABDOMEN: Soft, nontender, nondistended, normoactive bowel sounds.   EXTREMITIES: Skin warm and well perfused, no clubbing, cyanosis, or edema.  NEURO: No focal findings. Normal speech. Mood and affect normal.   SKIN: Normal without suspicious lesions on exposed skin.    Follow up: 3 months or sooner as needed    Allergies   Allergen Reactions    Toradol [Ketorolac Tromethamine] Hives    Ketorolac Rash         Current Outpatient Medications:     aspirin 81 mg chewable tablet, Chew 1 tablet " (81 mg total) daily, Disp: , Rfl:     atorvastatin (LIPITOR) 80 mg tablet, Take 1 tablet (80 mg total) by mouth daily with dinner, Disp: 30 tablet, Rfl: 0    citalopram (CeleXA) 20 mg tablet, Take 20 mg by mouth daily, Disp: , Rfl:     clopidogrel (PLAVIX) 75 mg tablet, Take 1 tablet (75 mg total) by mouth daily Do not start before January 7, 2024., Disp: 30 tablet, Rfl: 1    diclofenac sodium (VOLTAREN) 50 mg EC tablet, Take 50 mg by mouth 3 (three) times a day as needed, Disp: , Rfl:     hydrOXYzine HCL (ATARAX) 25 mg tablet, Take 1 tablet (25 mg total) by mouth every 6 (six) hours as needed for itching, Disp: 30 tablet, Rfl: 0    losartan (COZAAR) 25 mg tablet, Take 1 tablet (25 mg total) by mouth daily, Disp: 90 tablet, Rfl: 2    metoprolol succinate (TOPROL-XL) 25 mg 24 hr tablet, Take 1 tablet (25 mg total) by mouth daily, Disp: 30 tablet, Rfl: 0    nitroglycerin (NITROSTAT) 0.4 mg SL tablet, Place 1 tablet (0.4 mg total) under the tongue every 5 (five) minutes as needed for chest pain (monitor blood pressure.), Disp: 30 tablet, Rfl: 0    omeprazole (PriLOSEC) 40 MG capsule, Take 40 mg by mouth daily, Disp: , Rfl:     ondansetron (ZOFRAN-ODT) 4 mg disintegrating tablet, Take 1 tablet (4 mg total) by mouth every 6 (six) hours as needed for nausea or vomiting (Patient not taking: Reported on 3/11/2024), Disp: 20 tablet, Rfl: 0    Past Medical History:   Diagnosis Date    Hypertension     MI (myocardial infarction) (HCC)     Reflux gastritis        History reviewed. No pertinent family history.    Past Medical History:   Diagnosis Date    Hypertension     MI (myocardial infarction) (HCC)     Reflux gastritis        Past Surgical History:   Procedure Laterality Date    CARDIAC CATHETERIZATION N/A 1/5/2024    Procedure: Cardiac catheterization;  Surgeon: Quirino Combs MD;  Location: [facility];  Service: Cardiology    CARDIAC CATHETERIZATION Left 1/5/2024    Procedure: Cardiac Left Heart Cath;   "Surgeon: Quirino Combs MD;  Location: [facility];  Service: Cardiology    CARDIAC CATHETERIZATION N/A 1/5/2024    Procedure: Cardiac Coronary Angiogram;  Surgeon: Quirino Combs MD;  Location: [facility];  Service: Cardiology    CARDIAC CATHETERIZATION N/A 1/5/2024    Procedure: Cardiac pci;  Surgeon: Quirino Combs MD;  Location: [facility];  Service: Cardiology    NOSE SURGERY         Social History     Socioeconomic History    Marital status:      Spouse name: Not on file    Number of children: Not on file    Years of education: Not on file    Highest education level: Not on file   Occupational History    Not on file   Tobacco Use    Smoking status: Never    Smokeless tobacco: Never   Vaping Use    Vaping status: Never Used   Substance and Sexual Activity    Alcohol use: Never    Drug use: Never    Sexual activity: Not on file   Other Topics Concern    Not on file   Social History Narrative    ** Merged History Encounter **          Social Determinants of Health     Financial Resource Strain: Not on file   Food Insecurity: Not on file   Transportation Needs: Not on file   Physical Activity: Not on file   Stress: Not on file   Social Connections: Not on file   Intimate Partner Violence: Not on file   Housing Stability: Not on file             LABORATORY RESULTS:    Lab Results   Component Value Date    WBC 7.44 02/10/2024    HGB 14.6 02/10/2024    HCT 43.8 02/10/2024    MCV 86 02/10/2024     02/10/2024     Lab Results   Component Value Date    CALCIUM 9.5 02/10/2024    K 4.2 02/10/2024    CO2 27 02/10/2024     02/10/2024    BUN 21 02/10/2024    CREATININE 1.08 02/10/2024     Lab Results   Component Value Date    HGBA1C 6.6 (H) 01/05/2024       Lipid Profile:   No results found for: \"CHOL\"  Lab Results   Component Value Date    HDL 47 01/05/2024     Lab Results   Component Value Date    LDLCALC 175 (H) 01/05/2024     Lab Results   Component Value Date    " TRIG 224 (H) 01/05/2024       The ASCVD Risk score (Ian REILLY, et al., 2019) failed to calculate for the following reasons:    The patient has a prior MI or stroke diagnosis    1. Coronary artery disease involving native coronary artery of native heart, unspecified whether angina present        2. Primary hypertension  losartan (COZAAR) 25 mg tablet      3. Mixed hyperlipidemia        4. Type 2 diabetes mellitus with other specified complication, unspecified whether long term insulin use (HCC)            Imaging: I have personally reviewed pertinent reports.        Recommend aggressive risk factor modification and therapeutic lifestyle changes.  Low-salt, low-calorie, low-fat, low-cholesterol diet with regular exercise and to optimize weight.    Discussed concepts of atherosclerosis, including signs and symptoms of cardiac disease.    Medications reviewed and possible side effects discussed.  Previous studies were reviewed.    Safety measures were reviewed.  All questions and concerns addressed.  Patient was advised to report any problems requiring medical attention.    Follow-up with PCP and appropriate specialist and lab work as discussed.    Return for follow up visit as scheduled or earlier, if needed.  Thank you for allowing me to participate in the care and evaluation of your patient.  Should you have any questions, please feel free to contact me.    George Plata PA-C  3/11/2024,9:48 AM

## 2024-03-11 ENCOUNTER — OFFICE VISIT (OUTPATIENT)
Dept: CARDIOLOGY CLINIC | Facility: CLINIC | Age: 59
End: 2024-03-11

## 2024-03-11 VITALS
RESPIRATION RATE: 16 BRPM | HEART RATE: 74 BPM | OXYGEN SATURATION: 96 % | BODY MASS INDEX: 29.96 KG/M2 | DIASTOLIC BLOOD PRESSURE: 84 MMHG | HEIGHT: 71 IN | WEIGHT: 214 LBS | SYSTOLIC BLOOD PRESSURE: 126 MMHG

## 2024-03-11 DIAGNOSIS — E11.69 TYPE 2 DIABETES MELLITUS WITH OTHER SPECIFIED COMPLICATION, UNSPECIFIED WHETHER LONG TERM INSULIN USE (HCC): ICD-10-CM

## 2024-03-11 DIAGNOSIS — E78.2 MIXED HYPERLIPIDEMIA: ICD-10-CM

## 2024-03-11 DIAGNOSIS — I10 PRIMARY HYPERTENSION: ICD-10-CM

## 2024-03-11 DIAGNOSIS — I25.10 CORONARY ARTERY DISEASE INVOLVING NATIVE CORONARY ARTERY OF NATIVE HEART, UNSPECIFIED WHETHER ANGINA PRESENT: Primary | ICD-10-CM

## 2024-03-11 PROCEDURE — 99214 OFFICE O/P EST MOD 30 MIN: CPT

## 2024-03-11 RX ORDER — LOSARTAN POTASSIUM 25 MG/1
25 TABLET ORAL DAILY
Qty: 90 TABLET | Refills: 2 | Status: SHIPPED | OUTPATIENT
Start: 2024-03-11

## 2024-03-21 ENCOUNTER — TELEPHONE (OUTPATIENT)
Dept: CARDIOLOGY CLINIC | Facility: CLINIC | Age: 59
End: 2024-03-21

## 2024-03-21 NOTE — TELEPHONE ENCOUNTER
Spoke with patient and he states he been dizzy seen he started losartan.    Patient state no other symptoms    Please advise

## 2024-03-28 NOTE — TELEPHONE ENCOUNTER
Spoke to pt and he stated that he took his BP around 9:30 am yesterday which was 122/77.     Pt stated that he feels dizzy/ lightheadedness all throughout the day and when he gets up.     Pt was advised to take his time when getting, stay hydrated and continue to check his BP and keep a log.     Pt verbally understood.

## 2024-06-20 ENCOUNTER — APPOINTMENT (EMERGENCY)
Dept: RADIOLOGY | Facility: HOSPITAL | Age: 59
End: 2024-06-20
Payer: COMMERCIAL

## 2024-06-20 ENCOUNTER — HOSPITAL ENCOUNTER (EMERGENCY)
Facility: HOSPITAL | Age: 59
Discharge: HOME | End: 2024-06-20
Attending: EMERGENCY MEDICINE
Payer: COMMERCIAL

## 2024-06-20 ENCOUNTER — APPOINTMENT (EMERGENCY)
Dept: CT IMAGING | Facility: HOSPITAL | Age: 59
End: 2024-06-20
Payer: COMMERCIAL

## 2024-06-20 VITALS
TEMPERATURE: 98 F | DIASTOLIC BLOOD PRESSURE: 90 MMHG | RESPIRATION RATE: 21 BRPM | SYSTOLIC BLOOD PRESSURE: 138 MMHG | HEART RATE: 74 BPM | OXYGEN SATURATION: 95 %
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
## 2024-06-20 DIAGNOSIS — S09.90XA INJURY OF HEAD, INITIAL ENCOUNTER: ICD-10-CM

## 2024-06-20 LAB
2HR DELTA HS TROPONIN: 0 NG/L
ALBUMIN SERPL BCG-MCNC: 4.8 G/DL (ref 3.5–5)
ALP SERPL-CCNC: 117 U/L (ref 34–104)
ALT SERPL W P-5'-P-CCNC: 41 U/L (ref 7–52)
ANION GAP SERPL CALCULATED.3IONS-SCNC: 10 MMOL/L (ref 4–13)
AST SERPL W P-5'-P-CCNC: 31 U/L (ref 13–39)
BASOPHILS # BLD AUTO: 0.06 THOUSANDS/ÂΜL (ref 0–0.1)
BASOPHILS NFR BLD AUTO: 1 % (ref 0–1)
BILIRUB SERPL-MCNC: 0.73 MG/DL (ref 0.2–1)
BUN SERPL-MCNC: 20 MG/DL (ref 5–25)
CALCIUM SERPL-MCNC: 9.3 MG/DL (ref 8.4–10.2)
CARDIAC TROPONIN I PNL SERPL HS: 6 NG/L
CARDIAC TROPONIN I PNL SERPL HS: 6 NG/L
CHLORIDE SERPL-SCNC: 103 MMOL/L (ref 96–108)
CO2 SERPL-SCNC: 24 MMOL/L (ref 21–32)
CREAT SERPL-MCNC: 0.99 MG/DL (ref 0.6–1.3)
EOSINOPHIL # BLD AUTO: 0.05 THOUSAND/ÂΜL (ref 0–0.61)
EOSINOPHIL NFR BLD AUTO: 1 % (ref 0–6)
ERYTHROCYTE [DISTWIDTH] IN BLOOD BY AUTOMATED COUNT: 13.3 % (ref 11.6–15.1)
GFR SERPL CREATININE-BSD FRML MDRD: 82 ML/MIN/1.73SQ M
GLUCOSE SERPL-MCNC: 104 MG/DL (ref 65–140)
HCT VFR BLD AUTO: 43.8 % (ref 36.5–49.3)
HGB BLD-MCNC: 14.5 G/DL (ref 12–17)
IMM GRANULOCYTES # BLD AUTO: 0.02 THOUSAND/UL (ref 0–0.2)
IMM GRANULOCYTES NFR BLD AUTO: 0 % (ref 0–2)
LYMPHOCYTES # BLD AUTO: 2.11 THOUSANDS/ÂΜL (ref 0.6–4.47)
LYMPHOCYTES NFR BLD AUTO: 27 % (ref 14–44)
MCH RBC QN AUTO: 28.2 PG (ref 26.8–34.3)
MCHC RBC AUTO-ENTMCNC: 33.1 G/DL (ref 31.4–37.4)
MCV RBC AUTO: 85 FL (ref 82–98)
MONOCYTES # BLD AUTO: 0.67 THOUSAND/ÂΜL (ref 0.17–1.22)
MONOCYTES NFR BLD AUTO: 9 % (ref 4–12)
NEUTROPHILS # BLD AUTO: 4.99 THOUSANDS/ÂΜL (ref 1.85–7.62)
NEUTS SEG NFR BLD AUTO: 62 % (ref 43–75)
NRBC BLD AUTO-RTO: 0 /100 WBCS
PLATELET # BLD AUTO: 298 THOUSANDS/UL (ref 149–390)
PMV BLD AUTO: 9.3 FL (ref 8.9–12.7)
POTASSIUM SERPL-SCNC: 3.8 MMOL/L (ref 3.5–5.3)
PROT SERPL-MCNC: 7.6 G/DL (ref 6.4–8.4)
RBC # BLD AUTO: 5.14 MILLION/UL (ref 3.88–5.62)
SODIUM SERPL-SCNC: 137 MMOL/L (ref 135–147)
WBC # BLD AUTO: 7.9 THOUSAND/UL (ref 4.31–10.16)

## 2024-06-20 PROCEDURE — 70450 CT HEAD/BRAIN W/O DYE: CPT

## 2024-06-20 PROCEDURE — 71260 CT THORAX DX C+: CPT

## 2024-06-20 PROCEDURE — 74177 CT ABD & PELVIS W/CONTRAST: CPT

## 2024-06-20 PROCEDURE — 93005 ELECTROCARDIOGRAM TRACING: CPT

## 2024-06-20 PROCEDURE — 73110 X-RAY EXAM OF WRIST: CPT

## 2024-06-20 PROCEDURE — 84484 ASSAY OF TROPONIN QUANT: CPT | Performed by: EMERGENCY MEDICINE

## 2024-06-20 PROCEDURE — 71045 X-RAY EXAM CHEST 1 VIEW: CPT

## 2024-06-20 PROCEDURE — 36415 COLL VENOUS BLD VENIPUNCTURE: CPT | Performed by: EMERGENCY MEDICINE

## 2024-06-20 PROCEDURE — 80053 COMPREHEN METABOLIC PANEL: CPT | Performed by: EMERGENCY MEDICINE

## 2024-06-20 PROCEDURE — 72125 CT NECK SPINE W/O DYE: CPT

## 2024-06-20 PROCEDURE — 99285 EMERGENCY DEPT VISIT HI MDM: CPT | Performed by: EMERGENCY MEDICINE

## 2024-06-20 PROCEDURE — 85025 COMPLETE CBC W/AUTO DIFF WBC: CPT | Performed by: EMERGENCY MEDICINE

## 2024-06-20 PROCEDURE — 99285 EMERGENCY DEPT VISIT HI MDM: CPT

## 2024-06-20 PROCEDURE — 73060 X-RAY EXAM OF HUMERUS: CPT

## 2024-06-20 RX ORDER — ACETAMINOPHEN 325 MG/1
975 TABLET ORAL ONCE
Status: COMPLETED | OUTPATIENT
Start: 2024-06-20 | End: 2024-06-20

## 2024-06-20 RX ADMIN — ACETAMINOPHEN 975 MG: 325 TABLET, FILM COATED ORAL at 14:50

## 2024-06-20 RX ADMIN — IOHEXOL 100 ML: 350 INJECTION, SOLUTION INTRAVENOUS at 13:02

## 2024-06-20 NOTE — ED NOTES
Complain of right arm bruising, left elbow bruising, abdominal pain and severe headache.     Zack Park RN  06/20/24 2997

## 2024-06-22 LAB
ATRIAL RATE: 86 BPM
P AXIS: 76 DEGREES
PR INTERVAL: 160 MS
QRS AXIS: 62 DEGREES
QRSD INTERVAL: 76 MS
QT INTERVAL: 358 MS
QTC INTERVAL: 428 MS
T WAVE AXIS: 63 DEGREES
VENTRICULAR RATE: 86 BPM

## 2024-06-22 PROCEDURE — 93010 ELECTROCARDIOGRAM REPORT: CPT | Performed by: INTERNAL MEDICINE
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
## 2024-08-02 ENCOUNTER — OFFICE VISIT (OUTPATIENT)
Dept: CARDIOLOGY CLINIC | Facility: CLINIC | Age: 59
End: 2024-08-02
Payer: COMMERCIAL

## 2024-08-02 VITALS
RESPIRATION RATE: 16 BRPM | SYSTOLIC BLOOD PRESSURE: 118 MMHG | BODY MASS INDEX: 28.7 KG/M2 | WEIGHT: 205 LBS | HEIGHT: 71 IN | OXYGEN SATURATION: 96 % | HEART RATE: 86 BPM | DIASTOLIC BLOOD PRESSURE: 88 MMHG

## 2024-08-02 DIAGNOSIS — E78.2 MIXED HYPERLIPIDEMIA: ICD-10-CM

## 2024-08-02 DIAGNOSIS — I25.10 CORONARY ARTERY DISEASE INVOLVING NATIVE CORONARY ARTERY OF NATIVE HEART, UNSPECIFIED WHETHER ANGINA PRESENT: Primary | ICD-10-CM

## 2024-08-02 DIAGNOSIS — I10 PRIMARY HYPERTENSION: ICD-10-CM

## 2024-08-02 DIAGNOSIS — E11.69 TYPE 2 DIABETES MELLITUS WITH OTHER SPECIFIED COMPLICATION, UNSPECIFIED WHETHER LONG TERM INSULIN USE (HCC): ICD-10-CM

## 2024-08-02 PROCEDURE — 99214 OFFICE O/P EST MOD 30 MIN: CPT | Performed by: NURSE PRACTITIONER
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
## 2024-08-07 ENCOUNTER — HOSPITAL ENCOUNTER (OUTPATIENT)
Dept: NON INVASIVE DIAGNOSTICS | Facility: CLINIC | Age: 59
Discharge: HOME | End: 2024-08-07
Payer: COMMERCIAL

## 2024-08-07 ENCOUNTER — HOSPITAL ENCOUNTER (OUTPATIENT)
Dept: NON INVASIVE DIAGNOSTICS | Facility: CLINIC | Age: 59
End: 2024-08-07
Payer: COMMERCIAL

## 2024-08-07 VITALS
DIASTOLIC BLOOD PRESSURE: 96 MMHG | HEIGHT: 71 IN | HEART RATE: 61 BPM | BODY MASS INDEX: 28.7 KG/M2 | OXYGEN SATURATION: 99 % | SYSTOLIC BLOOD PRESSURE: 164 MMHG | WEIGHT: 205 LBS

## 2024-08-07 DIAGNOSIS — I25.10 CORONARY ARTERY DISEASE INVOLVING NATIVE CORONARY ARTERY OF NATIVE HEART, UNSPECIFIED WHETHER ANGINA PRESENT: ICD-10-CM

## 2024-08-07 LAB
CHEST PAIN STATEMENT: NORMAL
MAX DIASTOLIC BP: 92 MMHG
MAX HR PERCENT: 95 %
MAX HR: 151 BPM
MAX PREDICTED HEART RATE: 159 BPM
NUC STRESS DIASTOLIC VOLUME INDEX: 49 ML/M2
NUC STRESS EJECTION FRACTION: 67 %
NUC STRESS SYSTOLIC VOLUME INDEX: 16 ML/M2
PROTOCOL NAME: NORMAL
RATE PRESSURE PRODUCT: NORMAL
REASON FOR TERMINATION: NORMAL
SL CV REST NUCLEAR ISOTOPE DOSE: 10.42 MCI
SL CV STRESS NUCLEAR ISOTOPE DOSE: 28.9 MCI
SL CV STRESS RECOVERY BP: NORMAL MMHG
SL CV STRESS RECOVERY HR: 95 BPM
SL CV STRESS RECOVERY O2 SAT: 95 %
SL CV STRESS STAGE REACHED: 3
STRESS ANGINA INDEX: 0
STRESS BASELINE BP: NORMAL MMHG
STRESS BASELINE HR: 61 BPM
STRESS O2 SAT REST: 99 %
STRESS PEAK HR: 151 BPM
STRESS POST ESTIMATED WORKLOAD: 10.1 METS
STRESS POST EXERCISE DUR MIN: 7 MIN
STRESS POST EXERCISE DUR MIN: 7 MIN
STRESS POST EXERCISE DUR SEC: 1 SEC
STRESS POST EXERCISE DUR SEC: 1 SEC
STRESS POST O2 SAT PEAK: 96 %
STRESS POST PEAK BP: 214 MMHG
STRESS POST PEAK HR: 153 BPM
STRESS POST PEAK SYSTOLIC BP: 214 MMHG
STRESS/REST PERFUSION RATIO: 0.74
TARGET HR FORMULA: NORMAL

## 2024-08-07 PROCEDURE — 78452 HT MUSCLE IMAGE SPECT MULT: CPT | Performed by: INTERNAL MEDICINE

## 2024-08-07 PROCEDURE — 78452 HT MUSCLE IMAGE SPECT MULT: CPT

## 2024-08-07 PROCEDURE — 93017 CV STRESS TEST TRACING ONLY: CPT

## 2024-08-07 PROCEDURE — 93016 CV STRESS TEST SUPVJ ONLY: CPT | Performed by: INTERNAL MEDICINE

## 2024-08-07 PROCEDURE — A9502 TC99M TETROFOSMIN: HCPCS

## 2024-08-07 PROCEDURE — 93018 CV STRESS TEST I&R ONLY: CPT | Performed by: INTERNAL MEDICINE

## 2024-10-02 ENCOUNTER — OFFICE VISIT (OUTPATIENT)
Dept: CARDIOLOGY CLINIC | Facility: CLINIC | Age: 59
End: 2024-10-02
Payer: COMMERCIAL

## 2024-10-02 VITALS
WEIGHT: 212 LBS | SYSTOLIC BLOOD PRESSURE: 134 MMHG | OXYGEN SATURATION: 95 % | RESPIRATION RATE: 16 BRPM | BODY MASS INDEX: 29.68 KG/M2 | HEIGHT: 71 IN | HEART RATE: 84 BPM | DIASTOLIC BLOOD PRESSURE: 96 MMHG

## 2024-10-02 DIAGNOSIS — I25.10 CORONARY ARTERY DISEASE INVOLVING NATIVE CORONARY ARTERY OF NATIVE HEART WITHOUT ANGINA PECTORIS: ICD-10-CM

## 2024-10-02 DIAGNOSIS — I10 ESSENTIAL (PRIMARY) HYPERTENSION: ICD-10-CM

## 2024-10-02 DIAGNOSIS — R07.89 OTHER CHEST PAIN: Primary | ICD-10-CM

## 2024-10-02 DIAGNOSIS — E78.00 PURE HYPERCHOLESTEROLEMIA: ICD-10-CM

## 2024-10-02 PROCEDURE — 99214 OFFICE O/P EST MOD 30 MIN: CPT | Performed by: PHYSICIAN ASSISTANT

## 2024-10-02 NOTE — H&P (VIEW-ONLY)
[facility] [place]  [address]  [place] [address]  Cardiology Follow Up    Adryan Agee  1965  [id number]    Assessment & Plan  Other chest pain  Substernal, heaviness, tightness concerning for angina in pt with known cad/pci jan 2024  Recent stress test August 2024, resting EKG normal, 10.1 METS of exercise tolerance, small mild intensity inferior basal defect that is partially reversible consistent with inferobasal infarct with lianet-infarct ischemia  Given patient's symptoms, CAD, abnormal stress test discussed need for cardiac catheterization  Discussed cardiac catheterization, risk and benefits reviewed  Patient agreeable  CBC, BMP, pt/inr ordered, patient advised he cannot have cardiac cath until labs are complete  Plan for cath in the next 1 week  Coronary artery disease involving native coronary artery of native heart without angina pectoris  Pt with known CAD/PCI: Successful kissing Koffi drug-eluting stents to OM2/mid circumflex (Jan 2024)  Continue aspirin, Lipitor, Plavix, losartan, Toprol  Essential (primary) hypertension  Stable, continue aspirin, Lipitor, Plavix, losartan, Toprol  Pure hypercholesterolemia  Continue Lipitor    Continue on medications.  Plan for cardiac cath when blood work is done given symptoms quite concerning for angina in patient with known CAD, stenting, abnormal stress test.  Patient advised cath cannot be done until labs are done.  Will try and plan for cath in the next 1 week.  Advised to report any symptoms to medical attention.  Patient advised to avoid heavy exertion till cardiac cath is performed.  Continue all medications. Previous studies reviewed with patient, medications reviewed and possible side effects discussed. Continue risk factor modification. Optimize weight, regular exercise and follow up with appropriate specialists and primary care physician as discussed.  All questions answered. Patient advised to report any  problems prompting to medical attention. Return for follow up visit in 6 weeks or earlier if needed    Chief Complaint   Patient presents with    Follow-up       Interval History: Patient presents to the office today for an acute visit with complaints of chest pain.  Patient was seen back in August with symptoms concerning for angina.  Stress test was ordered at that time; small mild intensity inferior basal defect that is partially reversible consistent with inferobasal infarct with lianet-infarct ischemia.  Patient states pain is in the center of his chest that he describes as heaviness/tightness.  Patient also notes pain throughout both arms.  Patient states the pain occurs with exertion and pt is quite concerned about it. He states it feels similar to the pain back in January that was the same as when he needed stents.     Echo done in January EF 55%    PMH: CAD with SAVANNAH x 2 to OM 2/circumflex January 2024, hypertension, hyperlipidemia, diabetes    Patient Active Problem List   Diagnosis    Chest pain, unspecified    Elevated troponin    Mixed hyperlipidemia    Cervical spondylolysis    Coronary artery disease involving native heart without angina pectoris    Primary hypertension    Pure hypercholesterolemia     Past Medical History:   Diagnosis Date    Hypertension     MI (myocardial infarction) (HCC)     Reflux gastritis      Social History     Socioeconomic History    Marital status:      Spouse name: Not on file    Number of children: Not on file    Years of education: Not on file    Highest education level: Not on file   Occupational History    Not on file   Tobacco Use    Smoking status: Never    Smokeless tobacco: Never   Vaping Use    Vaping status: Never Used   Substance and Sexual Activity    Alcohol use: Never    Drug use: Never    Sexual activity: Not on file   Other Topics Concern    Not on file   Social History Narrative    ** Merged History Encounter **          Social Determinants of Health      Financial Resource Strain: Not on file   Food Insecurity: Not on file   Transportation Needs: Not on file   Physical Activity: Not on file   Stress: Not on file   Social Connections: Not on file   Intimate Partner Violence: Not on file   Housing Stability: Not on file      No family history on file.  Past Surgical History:   Procedure Laterality Date    CARDIAC CATHETERIZATION N/A 01/05/2024    Procedure: Cardiac catheterization;  Surgeon: Quirino Combs MD;  [facility];  Service: Cardiology    CARDIAC CATHETERIZATION Left 01/05/2024    Procedure: Cardiac Left Heart Cath;  Surgeon: Quirino Combs MD;  [facility];  Service: Cardiology    CARDIAC CATHETERIZATION N/A 01/05/2024    Procedure: Cardiac Coronary Angiogram;  Surgeon: Quirino Combs MD;  [facility];  Service: Cardiology    CARDIAC CATHETERIZATION N/A 01/05/2024    Procedure: Cardiac pci;  Surgeon: Quirino Combs MD;  [facility];  Service: Cardiology    CORONARY ANGIOPLASTY WITH STENT PLACEMENT  01/05/2024    SAVANNAH x 2  mCX/pOM2    NOSE SURGERY         Current Outpatient Medications:     aspirin 81 mg chewable tablet, Chew 1 tablet (81 mg total) daily, Disp: , Rfl:     atorvastatin (LIPITOR) 80 mg tablet, Take 1 tablet (80 mg total) by mouth daily with dinner, Disp: 30 tablet, Rfl: 0    citalopram (CeleXA) 20 mg tablet, Take 20 mg by mouth daily, Disp: , Rfl:     clopidogrel (PLAVIX) 75 mg tablet, Take 1 tablet (75 mg total) by mouth daily Do not start before January 7, 2024., Disp: 30 tablet, Rfl: 1    diclofenac sodium (VOLTAREN) 50 mg EC tablet, Take 50 mg by mouth 3 (three) times a day as needed, Disp: , Rfl:     losartan (COZAAR) 25 mg tablet, Take 1 tablet (25 mg total) by mouth daily, Disp: 90 tablet, Rfl: 2    metoprolol succinate (TOPROL-XL) 25 mg 24 hr tablet, Take 1 tablet (25 mg total) by mouth daily, Disp: 30 tablet, Rfl: 0    nitroglycerin (NITROSTAT)  "0.4 mg SL tablet, Place 1 tablet (0.4 mg total) under the tongue every 5 (five) minutes as needed for chest pain (monitor blood pressure.), Disp: 30 tablet, Rfl: 0    omeprazole (PriLOSEC) 40 MG capsule, Take 40 mg by mouth daily, Disp: , Rfl:     hydrOXYzine HCL (ATARAX) 25 mg tablet, Take 1 tablet (25 mg total) by mouth every 6 (six) hours as needed for itching (Patient not taking: Reported on 10/2/2024), Disp: 30 tablet, Rfl: 0    ondansetron (ZOFRAN-ODT) 4 mg disintegrating tablet, Take 1 tablet (4 mg total) by mouth every 6 (six) hours as needed for nausea or vomiting (Patient not taking: Reported on 10/2/2024), Disp: 20 tablet, Rfl: 0  Allergies   Allergen Reactions    Toradol [Ketorolac Tromethamine] Hives    Ketorolac Rash         Imaging: No results found.    Review of Systems:  Review of Systems   Constitutional:  Positive for fatigue.   Respiratory: Negative.     Cardiovascular:  Positive for chest pain.   Musculoskeletal:  Positive for arthralgias and myalgias.   Neurological: Negative.    Hematological: Negative.    Psychiatric/Behavioral: Negative.     All other systems reviewed and are negative.        /96 (BP Location: Left arm, Patient Position: Sitting, Cuff Size: Standard)   Pulse 84   Resp 16   Ht 5' 11\" (1.803 m)   Wt 96.2 kg (212 lb)   SpO2 95%   BMI 29.57 kg/m²     Physical Exam:  Physical Exam  Vitals and nursing note reviewed.   Constitutional:       Appearance: Normal appearance.   HENT:      Head: Normocephalic and atraumatic.   Cardiovascular:      Rate and Rhythm: Normal rate and regular rhythm.      Pulses: Normal pulses.      Heart sounds: Normal heart sounds.   Pulmonary:      Effort: Pulmonary effort is normal.      Breath sounds: Normal breath sounds.   Musculoskeletal:         General: Normal range of motion.      Cervical back: Normal range of motion and neck supple.   Skin:     General: Skin is warm and dry.   Neurological:      General: No focal deficit present.      " Mental Status: He is alert and oriented to person, place, and time.   Psychiatric:         Mood and Affect: Mood normal.         Behavior: Behavior normal.         Thought Content: Thought content normal.         Judgment: Judgment normal.

## 2024-10-02 NOTE — PATIENT INSTRUCTIONS
Continue on medications.  Plan for cardiac cath when blood work is done given symptoms quite concerning for angina in patient with known CAD, stenting, abnormal stress test.  Patient advised cath cannot be done until labs are done.  Will try and plan for cath in the next 1 week.  Advised to report any symptoms to medical attention.  Patient advised to avoid heavy exertion until cardiac cath is performed  Continue all medications. Previous studies reviewed with patient, medications reviewed and possible side effects discussed. Continue risk factor modification. Optimize weight, regular exercise and follow up with appropriate specialists and primary care physician as discussed.  All questions answered. Patient advised to report any problems prompting to medical attention. Return for follow up visit in 6 weeks or earlier if needed

## 2024-10-02 NOTE — ASSESSMENT & PLAN NOTE
Pt with known CAD/PCI: Successful kissing Koffi drug-eluting stents to OM2/mid circumflex (Jan 2024)  Continue aspirin, Lipitor, Plavix, losartan, Toprol

## 2024-10-02 NOTE — ASSESSMENT & PLAN NOTE
Substernal, heaviness, tightness concerning for angina in pt with known cad/pci jan 2024  Recent stress test August 2024, resting EKG normal, 10.1 METS of exercise tolerance, small mild intensity inferior basal defect that is partially reversible consistent with inferobasal infarct with lianet-infarct ischemia  Given patient's symptoms, CAD, abnormal stress test discussed need for cardiac catheterization  Discussed cardiac catheterization, risk and benefits reviewed  Patient agreeable  CBC, BMP, pt/inr ordered, patient advised he cannot have cardiac cath until labs are complete  Plan for cath in the next 1 week

## 2024-10-02 NOTE — PROGRESS NOTES
[facility] [place]  [address]  [place] [address]  Cardiology Follow Up    Adryan Agee  1965  [id number]    Assessment & Plan  Other chest pain  Substernal, heaviness, tightness concerning for angina in pt with known cad/pci jan 2024  Recent stress test August 2024, resting EKG normal, 10.1 METS of exercise tolerance, small mild intensity inferior basal defect that is partially reversible consistent with inferobasal infarct with lianet-infarct ischemia  Given patient's symptoms, CAD, abnormal stress test discussed need for cardiac catheterization  Discussed cardiac catheterization, risk and benefits reviewed  Patient agreeable  CBC, BMP, pt/inr ordered, patient advised he cannot have cardiac cath until labs are complete  Plan for cath in the next 1 week  Coronary artery disease involving native coronary artery of native heart without angina pectoris  Pt with known CAD/PCI: Successful kissing Koffi drug-eluting stents to OM2/mid circumflex (Jan 2024)  Continue aspirin, Lipitor, Plavix, losartan, Toprol  Essential (primary) hypertension  Stable, continue aspirin, Lipitor, Plavix, losartan, Toprol  Pure hypercholesterolemia  Continue Lipitor    Continue on medications.  Plan for cardiac cath when blood work is done given symptoms quite concerning for angina in patient with known CAD, stenting, abnormal stress test.  Patient advised cath cannot be done until labs are done.  Will try and plan for cath in the next 1 week.  Advised to report any symptoms to medical attention.  Patient advised to avoid heavy exertion till cardiac cath is performed.  Continue all medications. Previous studies reviewed with patient, medications reviewed and possible side effects discussed. Continue risk factor modification. Optimize weight, regular exercise and follow up with appropriate specialists and primary care physician as discussed.  All questions answered. Patient advised to report any  problems prompting to medical attention. Return for follow up visit in 6 weeks or earlier if needed    Chief Complaint   Patient presents with    Follow-up       Interval History: Patient presents to the office today for an acute visit with complaints of chest pain.  Patient was seen back in August with symptoms concerning for angina.  Stress test was ordered at that time; small mild intensity inferior basal defect that is partially reversible consistent with inferobasal infarct with lianet-infarct ischemia.  Patient states pain is in the center of his chest that he describes as heaviness/tightness.  Patient also notes pain throughout both arms.  Patient states the pain occurs with exertion and pt is quite concerned about it. He states it feels similar to the pain back in January that was the same as when he needed stents.     Echo done in January EF 55%    PMH: CAD with SAVANNAH x 2 to OM 2/circumflex January 2024, hypertension, hyperlipidemia, diabetes    Patient Active Problem List   Diagnosis    Chest pain, unspecified    Elevated troponin    Mixed hyperlipidemia    Cervical spondylolysis    Coronary artery disease involving native heart without angina pectoris    Primary hypertension    Pure hypercholesterolemia     Past Medical History:   Diagnosis Date    Hypertension     MI (myocardial infarction) (HCC)     Reflux gastritis      Social History     Socioeconomic History    Marital status:      Spouse name: Not on file    Number of children: Not on file    Years of education: Not on file    Highest education level: Not on file   Occupational History    Not on file   Tobacco Use    Smoking status: Never    Smokeless tobacco: Never   Vaping Use    Vaping status: Never Used   Substance and Sexual Activity    Alcohol use: Never    Drug use: Never    Sexual activity: Not on file   Other Topics Concern    Not on file   Social History Narrative    ** Merged History Encounter **          Social Determinants of Health      Financial Resource Strain: Not on file   Food Insecurity: Not on file   Transportation Needs: Not on file   Physical Activity: Not on file   Stress: Not on file   Social Connections: Not on file   Intimate Partner Violence: Not on file   Housing Stability: Not on file      No family history on file.  Past Surgical History:   Procedure Laterality Date    CARDIAC CATHETERIZATION N/A 01/05/2024    Procedure: Cardiac catheterization;  Surgeon: Quirino Combs MD;  [facility];  Service: Cardiology    CARDIAC CATHETERIZATION Left 01/05/2024    Procedure: Cardiac Left Heart Cath;  Surgeon: Quirino Combs MD;  [facility];  Service: Cardiology    CARDIAC CATHETERIZATION N/A 01/05/2024    Procedure: Cardiac Coronary Angiogram;  Surgeon: Quirino Combs MD;  [facility];  Service: Cardiology    CARDIAC CATHETERIZATION N/A 01/05/2024    Procedure: Cardiac pci;  Surgeon: Quirino Combs MD;  [facility];  Service: Cardiology    CORONARY ANGIOPLASTY WITH STENT PLACEMENT  01/05/2024    SAVANNAH x 2  mCX/pOM2    NOSE SURGERY         Current Outpatient Medications:     aspirin 81 mg chewable tablet, Chew 1 tablet (81 mg total) daily, Disp: , Rfl:     atorvastatin (LIPITOR) 80 mg tablet, Take 1 tablet (80 mg total) by mouth daily with dinner, Disp: 30 tablet, Rfl: 0    citalopram (CeleXA) 20 mg tablet, Take 20 mg by mouth daily, Disp: , Rfl:     clopidogrel (PLAVIX) 75 mg tablet, Take 1 tablet (75 mg total) by mouth daily Do not start before January 7, 2024., Disp: 30 tablet, Rfl: 1    diclofenac sodium (VOLTAREN) 50 mg EC tablet, Take 50 mg by mouth 3 (three) times a day as needed, Disp: , Rfl:     losartan (COZAAR) 25 mg tablet, Take 1 tablet (25 mg total) by mouth daily, Disp: 90 tablet, Rfl: 2    metoprolol succinate (TOPROL-XL) 25 mg 24 hr tablet, Take 1 tablet (25 mg total) by mouth daily, Disp: 30 tablet, Rfl: 0    nitroglycerin (NITROSTAT)  "0.4 mg SL tablet, Place 1 tablet (0.4 mg total) under the tongue every 5 (five) minutes as needed for chest pain (monitor blood pressure.), Disp: 30 tablet, Rfl: 0    omeprazole (PriLOSEC) 40 MG capsule, Take 40 mg by mouth daily, Disp: , Rfl:     hydrOXYzine HCL (ATARAX) 25 mg tablet, Take 1 tablet (25 mg total) by mouth every 6 (six) hours as needed for itching (Patient not taking: Reported on 10/2/2024), Disp: 30 tablet, Rfl: 0    ondansetron (ZOFRAN-ODT) 4 mg disintegrating tablet, Take 1 tablet (4 mg total) by mouth every 6 (six) hours as needed for nausea or vomiting (Patient not taking: Reported on 10/2/2024), Disp: 20 tablet, Rfl: 0  Allergies   Allergen Reactions    Toradol [Ketorolac Tromethamine] Hives    Ketorolac Rash         Imaging: No results found.    Review of Systems:  Review of Systems   Constitutional:  Positive for fatigue.   Respiratory: Negative.     Cardiovascular:  Positive for chest pain.   Musculoskeletal:  Positive for arthralgias and myalgias.   Neurological: Negative.    Hematological: Negative.    Psychiatric/Behavioral: Negative.     All other systems reviewed and are negative.        /96 (BP Location: Left arm, Patient Position: Sitting, Cuff Size: Standard)   Pulse 84   Resp 16   Ht 5' 11\" (1.803 m)   Wt 96.2 kg (212 lb)   SpO2 95%   BMI 29.57 kg/m²     Physical Exam:  Physical Exam  Vitals and nursing note reviewed.   Constitutional:       Appearance: Normal appearance.   HENT:      Head: Normocephalic and atraumatic.   Cardiovascular:      Rate and Rhythm: Normal rate and regular rhythm.      Pulses: Normal pulses.      Heart sounds: Normal heart sounds.   Pulmonary:      Effort: Pulmonary effort is normal.      Breath sounds: Normal breath sounds.   Musculoskeletal:         General: Normal range of motion.      Cervical back: Normal range of motion and neck supple.   Skin:     General: Skin is warm and dry.   Neurological:      General: No focal deficit present.      " Mental Status: He is alert and oriented to person, place, and time.   Psychiatric:         Mood and Affect: Mood normal.         Behavior: Behavior normal.         Thought Content: Thought content normal.         Judgment: Judgment normal.

## 2024-10-04 ENCOUNTER — TELEPHONE (OUTPATIENT)
Dept: CARDIOLOGY CLINIC | Facility: CLINIC | Age: 59
End: 2024-10-04

## 2024-10-04 NOTE — TELEPHONE ENCOUNTER
Spoke with patient     He went to a outside lab to get blood work done . Called office to get result .      Phone - [phone]

## 2024-10-04 NOTE — TELEPHONE ENCOUNTER
Patient requesting to speak further with provider regarding the following results:    Provider: Kavya Dickens PA-C     Lab   []  MRI  []  US  []  CT   []  X-Ray  []    Other   [x] CBC and differential           Basic metabolic panel           Protime-INR

## 2024-10-04 NOTE — TELEPHONE ENCOUNTER
Spoke with patient he verbally understood     Please reach out  to patient for cath as per Kavya GRANDA stated in task thank you

## 2024-10-07 ENCOUNTER — PREP FOR PROCEDURE (OUTPATIENT)
Dept: CARDIOLOGY CLINIC | Facility: CLINIC | Age: 59
End: 2024-10-07

## 2024-10-07 ENCOUNTER — TELEPHONE (OUTPATIENT)
Dept: CARDIOLOGY CLINIC | Facility: CLINIC | Age: 59
End: 2024-10-07

## 2024-10-07 DIAGNOSIS — R07.89 OTHER CHEST PAIN: Primary | ICD-10-CM

## 2024-10-07 DIAGNOSIS — I21.4 NSTEMI (NON-ST ELEVATED MYOCARDIAL INFARCTION) (HCC): ICD-10-CM

## 2024-10-07 DIAGNOSIS — I25.10 CORONARY ARTERY DISEASE INVOLVING NATIVE CORONARY ARTERY OF NATIVE HEART WITHOUT ANGINA PECTORIS: ICD-10-CM

## 2024-10-07 NOTE — TELEPHONE ENCOUNTER
Caller: Adryan    Doctor: Maninder    Reason for call: Patient calling about scheduling cardiac cath.     Call back#: [phone]

## 2024-10-07 NOTE — TELEPHONE ENCOUNTER
----- Message from Kavya Dickens PA-C sent at 10/2/2024 12:29 PM EDT -----  Please schedule patient for cardiac catheterization at [facility] in the next 1 week    I sent patient for blood work today hopefully he gets it done today.    Diagnosis: Chest pain, history of CAD/PCI  No dye allergy, kidney test pending  Patient on aspirin, Lipitor, Plavix, losartan, Toprol    I will let you know once I get the blood work back    ty

## 2024-10-07 NOTE — TELEPHONE ENCOUNTER
S/w pt. Advised of card cath scheduled for 10/18. Pt advised to hold losartan morning of procedure. Pt obtained labs ordered. Message unable to be sent to pt via [employer] with instructions provided over the phone. Pt verbalized understanding.

## 2024-10-18 ENCOUNTER — HOSPITAL ENCOUNTER (OUTPATIENT)
Facility: HOSPITAL | Age: 59
Setting detail: OUTPATIENT SURGERY
End: 2024-10-18
Attending: INTERNAL MEDICINE | Admitting: INTERNAL MEDICINE
Payer: COMMERCIAL

## 2024-10-18 VITALS
WEIGHT: 205.47 LBS | TEMPERATURE: 97.8 F | RESPIRATION RATE: 16 BRPM | HEART RATE: 68 BPM | SYSTOLIC BLOOD PRESSURE: 118 MMHG | OXYGEN SATURATION: 95 % | HEIGHT: 71 IN | BODY MASS INDEX: 28.77 KG/M2 | DIASTOLIC BLOOD PRESSURE: 73 MMHG

## 2024-10-18 DIAGNOSIS — I25.10 CORONARY ARTERY DISEASE INVOLVING NATIVE CORONARY ARTERY OF NATIVE HEART WITHOUT ANGINA PECTORIS: ICD-10-CM

## 2024-10-18 DIAGNOSIS — R07.89 OTHER CHEST PAIN: ICD-10-CM

## 2024-10-18 DIAGNOSIS — I21.4 NSTEMI (NON-ST ELEVATED MYOCARDIAL INFARCTION) (HCC): ICD-10-CM

## 2024-10-18 LAB
ATRIAL RATE: 73 BPM
P AXIS: 76 DEGREES
PR INTERVAL: 166 MS
QRS AXIS: 72 DEGREES
QRSD INTERVAL: 82 MS
QT INTERVAL: 382 MS
QTC INTERVAL: 420 MS
T WAVE AXIS: 82 DEGREES
VENTRICULAR RATE: 73 BPM

## 2024-10-18 PROCEDURE — 93010 ELECTROCARDIOGRAM REPORT: CPT | Performed by: STUDENT IN AN ORGANIZED HEALTH CARE EDUCATION/TRAINING PROGRAM

## 2024-10-18 PROCEDURE — C1894 INTRO/SHEATH, NON-LASER: HCPCS | Performed by: INTERNAL MEDICINE

## 2024-10-18 PROCEDURE — C1769 GUIDE WIRE: HCPCS | Performed by: INTERNAL MEDICINE

## 2024-10-18 PROCEDURE — 99152 MOD SED SAME PHYS/QHP 5/>YRS: CPT | Performed by: INTERNAL MEDICINE

## 2024-10-18 PROCEDURE — 93454 CORONARY ARTERY ANGIO S&I: CPT | Performed by: INTERNAL MEDICINE

## 2024-10-18 PROCEDURE — 99153 MOD SED SAME PHYS/QHP EA: CPT | Performed by: INTERNAL MEDICINE

## 2024-10-18 PROCEDURE — 93005 ELECTROCARDIOGRAM TRACING: CPT

## 2024-10-18 RX ORDER — LIDOCAINE WITH 8.4% SOD BICARB 0.9%(10ML)
SYRINGE (ML) INJECTION CODE/TRAUMA/SEDATION MEDICATION
Status: DISCONTINUED | OUTPATIENT
Start: 2024-10-18 | End: 2024-10-18 | Stop reason: HOSPADM

## 2024-10-18 RX ORDER — NITROGLYCERIN 20 MG/100ML
INJECTION INTRAVENOUS CODE/TRAUMA/SEDATION MEDICATION
Status: DISCONTINUED | OUTPATIENT
Start: 2024-10-18 | End: 2024-10-18 | Stop reason: HOSPADM

## 2024-10-18 RX ORDER — MIDAZOLAM HYDROCHLORIDE 2 MG/2ML
INJECTION, SOLUTION INTRAMUSCULAR; INTRAVENOUS CODE/TRAUMA/SEDATION MEDICATION
Status: DISCONTINUED | OUTPATIENT
Start: 2024-10-18 | End: 2024-10-18 | Stop reason: HOSPADM

## 2024-10-18 RX ORDER — SODIUM CHLORIDE 9 MG/ML
75 INJECTION, SOLUTION INTRAVENOUS CONTINUOUS
Status: DISCONTINUED | OUTPATIENT
Start: 2024-10-18 | End: 2024-10-18

## 2024-10-18 RX ORDER — SODIUM CHLORIDE 9 MG/ML
75 INJECTION, SOLUTION INTRAVENOUS CONTINUOUS
Status: DISCONTINUED | OUTPATIENT
Start: 2024-10-18 | End: 2024-10-18 | Stop reason: HOSPADM

## 2024-10-18 RX ORDER — FENTANYL CITRATE 50 UG/ML
INJECTION, SOLUTION INTRAMUSCULAR; INTRAVENOUS CODE/TRAUMA/SEDATION MEDICATION
Status: DISCONTINUED | OUTPATIENT
Start: 2024-10-18 | End: 2024-10-18 | Stop reason: HOSPADM

## 2024-10-18 RX ORDER — HEPARIN SODIUM 1000 [USP'U]/ML
INJECTION, SOLUTION INTRAVENOUS; SUBCUTANEOUS CODE/TRAUMA/SEDATION MEDICATION
Status: DISCONTINUED | OUTPATIENT
Start: 2024-10-18 | End: 2024-10-18 | Stop reason: HOSPADM

## 2024-10-18 RX ORDER — IODIXANOL 320 MG/ML
INJECTION, SOLUTION INTRAVASCULAR CODE/TRAUMA/SEDATION MEDICATION
Status: DISCONTINUED | OUTPATIENT
Start: 2024-10-18 | End: 2024-10-18 | Stop reason: HOSPADM

## 2024-10-18 RX ORDER — VERAPAMIL HCL 2.5 MG/ML
AMPUL (ML) INTRAVENOUS CODE/TRAUMA/SEDATION MEDICATION
Status: DISCONTINUED | OUTPATIENT
Start: 2024-10-18 | End: 2024-10-18 | Stop reason: HOSPADM

## 2024-10-18 RX ADMIN — SODIUM CHLORIDE 75 ML/HR: 0.9 INJECTION, SOLUTION INTRAVENOUS at 07:43

## 2024-10-18 NOTE — INTERVAL H&P NOTE
H&P reviewed. After examining the patient I find no changes in the patients condition since the H&P had been written.    Discussed the indications, alternatives, risks and benefit of cardiac catheterization and possible PCI. The procedure risks, benefits, and complications (including but not limited to bleeding, infection, arrhythmia, nephrotoxicity, vessel injury, myocardial infarction, CVA, and death) were reviewed.  Patient is alert and oriented x3 and wishes to proceed. All questions answered.  Denies history of iodinated contrast allergy.      Vitals:    10/18/24 0723   BP: 127/85   Pulse: 78   Resp: 18   Temp: (!) 96.6 °F (35.9 °C)   SpO2: 96%

## 2024-11-07 ENCOUNTER — TELEPHONE (OUTPATIENT)
Dept: CARDIOLOGY CLINIC | Facility: CLINIC | Age: 59
End: 2024-11-07

## 2024-11-07 ENCOUNTER — OFFICE VISIT (OUTPATIENT)
Dept: CARDIOLOGY CLINIC | Facility: CLINIC | Age: 59
End: 2024-11-07
Payer: COMMERCIAL

## 2024-11-07 VITALS
BODY MASS INDEX: 28.84 KG/M2 | OXYGEN SATURATION: 97 % | DIASTOLIC BLOOD PRESSURE: 80 MMHG | RESPIRATION RATE: 16 BRPM | WEIGHT: 206 LBS | HEIGHT: 71 IN | SYSTOLIC BLOOD PRESSURE: 120 MMHG | HEART RATE: 78 BPM

## 2024-11-07 DIAGNOSIS — M79.10 MYALGIA: ICD-10-CM

## 2024-11-07 DIAGNOSIS — R06.83 SNORING: ICD-10-CM

## 2024-11-07 DIAGNOSIS — I25.10 CORONARY ARTERY DISEASE INVOLVING NATIVE CORONARY ARTERY OF NATIVE HEART WITHOUT ANGINA PECTORIS: Primary | ICD-10-CM

## 2024-11-07 DIAGNOSIS — I10 ESSENTIAL (PRIMARY) HYPERTENSION: ICD-10-CM

## 2024-11-07 DIAGNOSIS — E78.00 PURE HYPERCHOLESTEROLEMIA: ICD-10-CM

## 2024-11-07 PROCEDURE — 99214 OFFICE O/P EST MOD 30 MIN: CPT | Performed by: PHYSICIAN ASSISTANT

## 2024-11-07 RX ORDER — ATORVASTATIN CALCIUM 80 MG/1
40 TABLET, FILM COATED ORAL
Start: 2024-11-07

## 2024-11-07 NOTE — PROGRESS NOTES
[facility] [place]  [address]  [place] [address]  Cardiology Follow Up    Adryan Agee  1965  [id number]    Assessment & Plan  Coronary artery disease involving native coronary artery of native heart without angina pectoris  Status post SAVANNAH x 2 to OM 2/circumflex January 2024  With a recent cardiac cath with patent stents; after abnormal stress test  Continue aspirin, Lipitor, Plavix, losartan, metoprolol  Report any symptoms  Essential (primary) hypertension  Stable, 120/80  Continue losartan, metoprolol  Pure hypercholesterolemia  Continue Lipitor but try 40 mg given myalgias and arthralgias; the next 1 month  If continued myalgias/arthralgias plan for statin holiday  Snoring  Fatigue, snoring.  Plan for sleep study  Myalgia  On Lipitor 80  Given myalgias and arthralgias plan for decreasing to 40 daily and reassess for 1 month.  If patient continues may need to consider statin holiday    Continue on medications.  Report any symptoms.  Decrease Lipitor to 40 daily for the next 1 month if continued myalgias and arthralgias plan for statin holiday.  Plan for sleep study.  Report any bleeding on aspirin and Plavix.  Follow-up with PCP.  Jury duty letter written as patient should not attend jury duty given recent cardiac catheterizations.   Continue all medications. Previous studies reviewed with patient, medications reviewed and possible side effects discussed. Continue risk factor modification. Optimize weight, regular exercise and follow up with appropriate specialists and primary care physician as discussed.  All questions answered. Patient advised to report any problems prompting to medical attention. Return for follow up visit in 4 months or earlier if needed    Chief Complaint   Patient presents with    Follow-up       Interval History: Patient presents office today for follow-up visit after recent cardiac catheterization.  Cardiac catheterization showed patent  distal circumflex and OM 2 stents and nonobstructive CAD of the LAD.  Patient states he is overall feeling pretty well except for some myalgias and arthralgias.  Having ongoing neck pain and hip pain.  Wonders if it is from the Lipitor.  Leaving for hunting trip tomorrow to [place].  Any bleeding troubles on aspirin and Plavix.  Does note excessive fatigue does admit he snores loudly.  Has never had sleep study.      PMH: CAD with SAVANNAH x 2 to OM2/circumflex January 2024, hypertension, hyperlipidemia, diabetes    Patient Active Problem List   Diagnosis    Chest pain, unspecified    Elevated troponin    Mixed hyperlipidemia    Cervical spondylolysis    Coronary artery disease involving native heart without angina pectoris    Primary hypertension    Pure hypercholesterolemia     Past Medical History:   Diagnosis Date    Hypertension     MI (myocardial infarction) (HCC)     Reflux gastritis      Social History     Socioeconomic History    Marital status:      Spouse name: Not on file    Number of children: Not on file    Years of education: Not on file    Highest education level: Not on file   Occupational History    Not on file   Tobacco Use    Smoking status: Never    Smokeless tobacco: Never   Vaping Use    Vaping status: Never Used   Substance and Sexual Activity    Alcohol use: Never    Drug use: Never    Sexual activity: Not on file   Other Topics Concern    Not on file   Social History Narrative    ** Merged History Encounter **          Social Determinants of Health     Financial Resource Strain: Not on file   Food Insecurity: Not on file   Transportation Needs: Not on file   Physical Activity: Not on file   Stress: Not on file   Social Connections: Not on file   Intimate Partner Violence: Not on file   Housing Stability: Not on file      No family history on file.  Past Surgical History:   Procedure Laterality Date    CARDIAC CATHETERIZATION N/A 01/05/2024    Procedure: Cardiac catheterization;  Surgeon:  Quirino Combs MD;  Location: [facility];  Service: Cardiology    CARDIAC CATHETERIZATION Left 01/05/2024    Procedure: Cardiac Left Heart Cath;  Surgeon: Quirino Combs MD;  Location: [facility];  Service: Cardiology    CARDIAC CATHETERIZATION N/A 01/05/2024    Procedure: Cardiac Coronary Angiogram;  Surgeon: Quirino Combs MD;  [facility]: [facility];  Service: Cardiology    CARDIAC CATHETERIZATION N/A 01/05/2024    Procedure: Cardiac pci;  Surgeon: Quirino Combs MD;  [facility]: [facility];  Service: Cardiology    CARDIAC CATHETERIZATION Left 10/18/2024    Procedure: Cardiac Left Heart Cath;  Surgeon: Brent Lacey MD;  [facility];  Service: Cardiology    CARDIAC CATHETERIZATION N/A 10/18/2024    Procedure: Cardiac Coronary Angiogram;  Surgeon: Brent Lacey MD;  [facility]: [facility];  Service: Cardiology    CORONARY ANGIOPLASTY WITH STENT PLACEMENT  01/05/2024    SAVANNAH x 2  mCX/pOM2    NOSE SURGERY         Current Outpatient Medications:     aspirin 81 mg chewable tablet, Chew 1 tablet (81 mg total) daily, Disp: , Rfl:     atorvastatin (LIPITOR) 80 mg tablet, Take 0.5 tablets (40 mg total) by mouth daily with dinner, Disp: , Rfl:     citalopram (CeleXA) 20 mg tablet, Take 20 mg by mouth daily, Disp: , Rfl:     clopidogrel (PLAVIX) 75 mg tablet, Take 1 tablet (75 mg total) by mouth daily Do not start before January 7, 2024., Disp: 30 tablet, Rfl: 1    diclofenac sodium (VOLTAREN) 50 mg EC tablet, Take 50 mg by mouth 3 (three) times a day as needed, Disp: , Rfl:     hydrOXYzine HCL (ATARAX) 25 mg tablet, Take 1 tablet (25 mg total) by mouth every 6 (six) hours as needed for itching, Disp: 30 tablet, Rfl: 0    losartan (COZAAR) 25 mg tablet, Take 1 tablet (25 mg total) by mouth daily, Disp: 90 tablet, Rfl: 2    metoprolol succinate (TOPROL-XL) 25 mg 24 hr tablet, Take 1 tablet (25 mg total) by mouth daily, Disp: 30 tablet, Rfl:  0    nitroglycerin (NITROSTAT) 0.4 mg SL tablet, Place 1 tablet (0.4 mg total) under the tongue every 5 (five) minutes as needed for chest pain (monitor blood pressure.), Disp: 30 tablet, Rfl: 0    omeprazole (PriLOSEC) 40 MG capsule, Take 40 mg by mouth daily, Disp: , Rfl:     ondansetron (ZOFRAN-ODT) 4 mg disintegrating tablet, Take 1 tablet (4 mg total) by mouth every 6 (six) hours as needed for nausea or vomiting, Disp: 20 tablet, Rfl: 0  Allergies   Allergen Reactions    Toradol [Ketorolac Tromethamine] Hives    Ketorolac Rash         Imaging: XR wrist 3+ vw right    Result Date: 10/25/2024  Narrative: Radiology: XR interpretation limited to just orthopedic interpretation. Bilateral wrist 3+ view(s) done in clinic today shows no fractures or dislocations, severe left thumb cmc osteoarthritic changes, no other significant degenerative joint disease or any other bone lesions.    Cardiac catheterization    Result Date: 10/18/2024  Narrative:   Widely patent proximla and distal LCX and OM2 stents   Non-obstructive Mild CAD (30%) in the proximal-mid  LCX in between stents (vs oversized stents)   RCA is large with slow flow but no obstructive CAD   LAD is widely patent   Prox Cx lesion is 30% stenosed. Indication:  Chest pain, Abnormal stress test Procedure performed: [facility]  Procedure: Informed consent was obtained. Access was obtained with ultrasound in the right wrist. 6 Fr slender sheath was inserted. Cocktail was administered as per protocol. A JR4 catheter was advanced into the ascending aorta and right coronary ostium was selectively engaged. Angiographic images were obtained. The catheter was then exchanged for JL4. Angiographic images were obtained in orthogonal projections. The catheter was then removed w/o complications.        Review of Systems:  Review of Systems   Constitutional: Negative.    Respiratory: Negative.     Cardiovascular: Negative.    Musculoskeletal:  Positive for arthralgias and myalgias.  "  Neurological: Negative.    Hematological: Negative.    Psychiatric/Behavioral: Negative.     All other systems reviewed and are negative.        /80 (BP Location: Left arm, Patient Position: Sitting, Cuff Size: Standard)   Pulse 78   Resp 16   Ht 5' 11\" (1.803 m)   Wt 93.4 kg (206 lb)   SpO2 97%   BMI 28.73 kg/m²     Physical Exam:  Physical Exam  Vitals and nursing note reviewed.   Constitutional:       Appearance: Normal appearance.   HENT:      Head: Normocephalic and atraumatic.   Cardiovascular:      Rate and Rhythm: Normal rate and regular rhythm.      Pulses: Normal pulses.      Heart sounds: Normal heart sounds.   Pulmonary:      Effort: Pulmonary effort is normal.      Breath sounds: Normal breath sounds.   Musculoskeletal:         General: Normal range of motion.      Cervical back: Normal range of motion and neck supple.   Skin:     General: Skin is warm and dry.   Neurological:      General: No focal deficit present.      Mental Status: He is alert and oriented to person, place, and time.   Psychiatric:         Mood and Affect: Mood normal.         Behavior: Behavior normal.         Thought Content: Thought content normal.         Judgment: Judgment normal.         "

## 2024-11-07 NOTE — TELEPHONE ENCOUNTER
Letter requesting to be excused from jury duty due to medical reasons faxed and scanned in chart.    Fax confirmation uploaded.    Pt requesting letter to be mailed to his home- letter sent.

## 2024-11-07 NOTE — ASSESSMENT & PLAN NOTE
Continue Lipitor but try 40 mg given myalgias and arthralgias; the next 1 month  If continued myalgias/arthralgias plan for statin holiday

## 2024-11-07 NOTE — ASSESSMENT & PLAN NOTE
Status post SAVANNAH x 2 to OM 2/circumflex January 2024  With a recent cardiac cath with patent stents; after abnormal stress test  Continue aspirin, Lipitor, Plavix, losartan, metoprolol  Report any symptoms

## 2024-11-07 NOTE — LETTER
Date: 11/7/2024    To whom it may concern:     This is to certify that Adryan Agee has been under my care for the following diagnosis: CAD s/p Myocardial Infarct; s/p PCI with 2 cardiac catherizations this year,  Hypertension, Hyperlipidemia, Myalgias/arthralgias.         I feel that Adryan Agee is unable to serve on Jury Duty at this time for the above mentioned medical reasons.                  Sincerely,  Kavya Dickens PA-C

## 2024-11-07 NOTE — PATIENT INSTRUCTIONS
Continue on medications.  Report any symptoms.  Decrease Lipitor to 40 daily for the next 1 month if continued myalgias and arthralgias plan for statin holiday.  Plan for sleep study.  Report any bleeding on aspirin and Plavix.  Follow-up with PCP.  Jury duty letter written as patient should not attend jury duty given recent cardiac catheterizations.   Continue all medications. Previous studies reviewed with patient, medications reviewed and possible side effects discussed. Continue risk factor modification. Optimize weight, regular exercise and follow up with appropriate specialists and primary care physician as discussed.  All questions answered. Patient advised to report any problems prompting to medical attention. Return for follow up visit in 4 months or earlier if needed

## 2024-11-21 ENCOUNTER — TELEPHONE (OUTPATIENT)
Dept: SLEEP CENTER | Facility: CLINIC | Age: 59
End: 2024-11-21

## 2024-11-21 ENCOUNTER — TRANSCRIBE ORDERS (OUTPATIENT)
Dept: SLEEP CENTER | Facility: CLINIC | Age: 59
End: 2024-11-21

## 2024-11-21 DIAGNOSIS — R06.83 SNORING: Primary | ICD-10-CM

## 2024-12-17 ENCOUNTER — TELEPHONE (OUTPATIENT)
Age: 59
End: 2024-12-17

## 2024-12-17 DIAGNOSIS — I10 PRIMARY HYPERTENSION: ICD-10-CM

## 2024-12-17 NOTE — TELEPHONE ENCOUNTER
Patient called the RX Refill Line. Message is being forwarded to the office.     Patient is requesting a call back, stated that he's been without the losartan for 2 days and he's away in [place] for another couple days and he's not sure if he'll be ok missing about a weeks worth or if a script can be sent to the [facility] near him    [facility] number is , he spoke with Flory and she told him that they'd be able to fill a script if they could talk to someone from the office      Please contact patient at , pt is hoping for a call back either way and will still need the full script sent to the [facility] on file (if a full script isn't sent to [facility])

## 2024-12-17 NOTE — TELEPHONE ENCOUNTER
Caller: Adryan Agee    Doctor/Office: ZEINA Espinoza    Call regarding :  Losartan Potassium refill - he is out for 2 days & [place] - needs refill.  Noted [facility] working on today but not sure where it is in process.    Call was transferred to: [facility] - RX Outbound Line

## 2024-12-18 RX ORDER — LOSARTAN POTASSIUM 25 MG/1
25 TABLET ORAL DAILY
Qty: 90 TABLET | Refills: 1 | Status: SHIPPED | OUTPATIENT
Start: 2024-12-18

## 2025-01-16 ENCOUNTER — NURSE TRIAGE (OUTPATIENT)
Age: 60
End: 2025-01-16

## 2025-01-16 NOTE — TELEPHONE ENCOUNTER
"Reason for Conversation: pt reporting fluttering in chest and then feeling like he was just doing a work out over the past week. Pt was not able to report a HR at those times, unable to tell if elevated or irregular. But reports a BP and HR from today that are normal of 136/95 and HR of 76. Pt feels that his HR right now is a regular rhythm.     These episodes come and go over the past week and last a few minutes at a time. Pt reports he does get winded and fatigued after the episode. The patient denies chest pain, but does report that when they first started he had some shakiness, denies dizziness or lightheadedness.   Pt is not on an anticoagulant. Has not had this before. He is on Metoprolol 25mg daily.     Attempted to schedule a first available visit at [facility] tomorrow, however the patient lives too far. Advised patient that he should go to the ER should symptoms worsen to be evaluated as he may need to be anticoagulated, however a message would be sent to the provider in case there would be any cancellations today. The patient reported he has a visit with his PCP tomorrow and possibly today.     VS/Weight: (Note: Please include date/time vitals/weight were measured)  Today: 136/95; 76      Pain: No    Risk Factors: Status post SAVANNAH x 2 to OM 2/circumflex January 2024     Recent relevant testing and date of testing: Stress, Echo, and Cardiac Catheterization 10/18/24    Medication: Losartan 25mg daily, Metoprolol 25mg daily    Upcoming Office Visit: Yes 4/14/25    Last Office Visit: 11/7/24           Reason for Disposition   Age > 60 years  (Exception: Brief heartbeat symptoms that went away and now feels well.)    Answer Assessment - Initial Assessment Questions  1. DESCRIPTION: \"Please describe your heart rate or heartbeat that you are having\" (e.g., fast/slow, regular/irregular, skipped or extra beats, \"palpitations\")      Racing heart at times and winded   2. ONSET: \"When did it start?\" (e.g., " "minutes, hours, days)       About 1 week ago   3. DURATION: \"How long does it last\" (e.g., seconds, minutes, hours)      Couple minutes to 10 minutes   4. PATTERN \"Does it come and go, or has it been constant since it started?\"  \"Does it get worse with exertion?\"   \"Are you feeling it now?\"      Comes and goes   5. TAP: \"Using your hand, can you tap out what you are feeling on a chair or table in front of you, so that I can hear?\" Note: Not all patients can do this.        Fine right now - 76     7. RECURRENT SYMPTOM: \"Have you ever had this before?\" If Yes, ask: \"When was the last time?\" and \"What happened that time?\"       Has not had this before   8. CAUSE: \"What do you think is causing the palpitations?\"      Unsure  9. CARDIAC HISTORY: \"Do you have any history of heart disease?\" (e.g., heart attack, angina, bypass surgery, angioplasty, arrhythmia)       Heart attack 1/4/24 - 2 stents put in   10. OTHER SYMPTOMS: \"Do you have any other symptoms?\" (e.g., dizziness, chest pain, sweating, difficulty breathing)        SOB with palpitations, shakiness, weird sensation, denies chest pain, fatigue after the episode    Protocols used: Heart Rate and Heartbeat Questions-Adult-[place]    "

## 2025-01-17 NOTE — TELEPHONE ENCOUNTER
Received another call from pt asking for an update. He stated he did call his pcp as well yesterday who got him in for a visit. He stated he had an EKG done which pt stated was normal. He was told to follow up with Cardiology. Pt stated he feels his s/s are similar to before he had his second cardiac cath on 10/18. Please review and advise.

## 2025-01-28 ENCOUNTER — OFFICE VISIT (OUTPATIENT)
Dept: CARDIOLOGY CLINIC | Facility: CLINIC | Age: 60
End: 2025-01-28
Payer: COMMERCIAL

## 2025-01-28 VITALS
SYSTOLIC BLOOD PRESSURE: 138 MMHG | WEIGHT: 208 LBS | DIASTOLIC BLOOD PRESSURE: 84 MMHG | RESPIRATION RATE: 16 BRPM | HEIGHT: 71 IN | HEART RATE: 79 BPM | OXYGEN SATURATION: 92 % | BODY MASS INDEX: 29.12 KG/M2

## 2025-01-28 DIAGNOSIS — I10 PRIMARY HYPERTENSION: ICD-10-CM

## 2025-01-28 DIAGNOSIS — R07.89 CHEST DISCOMFORT: ICD-10-CM

## 2025-01-28 DIAGNOSIS — R00.2 PALPITATIONS: Primary | ICD-10-CM

## 2025-01-28 DIAGNOSIS — E78.2 MIXED HYPERLIPIDEMIA: ICD-10-CM

## 2025-01-28 DIAGNOSIS — I25.10 CORONARY ARTERY DISEASE INVOLVING NATIVE CORONARY ARTERY OF NATIVE HEART, UNSPECIFIED WHETHER ANGINA PRESENT: ICD-10-CM

## 2025-01-28 PROCEDURE — 99214 OFFICE O/P EST MOD 30 MIN: CPT

## 2025-01-28 PROCEDURE — 93000 ELECTROCARDIOGRAM COMPLETE: CPT

## 2025-01-28 RX ORDER — METOPROLOL SUCCINATE 25 MG/1
25 TABLET, EXTENDED RELEASE ORAL 2 TIMES DAILY
Start: 2025-01-28 | End: 2025-02-06 | Stop reason: SDUPTHER

## 2025-01-28 RX ORDER — METOPROLOL SUCCINATE 50 MG/1
50 TABLET, EXTENDED RELEASE ORAL DAILY
Start: 2025-01-28 | End: 2025-01-28

## 2025-01-28 NOTE — ASSESSMENT & PLAN NOTE
Continue losartan 25 mg daily.  Toprol will be uptitrated per above.  Ambulatory BP monitoring and low-sodium diet advised.

## 2025-01-28 NOTE — PROGRESS NOTES
CARDIOLOGY OFFICE VISIT  [facility]  [address] [place] [id number]  Tel: [phone]      NAME: Adryan Agee  AGE: 59 y.o.  SEX: male  : 1965  MRN: [id number]      Chief Complaint:  Chief Complaint   Patient presents with    Follow-up       Assessment and Plan:    Assessment & Plan  Palpitations  He notes palpitations with associated lightheadedness, SOB, chest discomfort.  Uptitrate Toprol to 25 mg twice daily.  He was advised to monitor his blood pressure and heart rate, and notify our office of side effects  1 week event monitor ordered for further evaluation  CBC, CMP, mag, TSH ordered for further evaluation  Echocardiogram ordered for further evaluation  He was advised to stay well-hydrated and decrease his caffeine intake    Chest discomfort  Recent cardiac catheterization demonstrated widely patent stents.  Echocardiogram ordered  His symptoms may be associated with palpitations, versus small vessel CAD given recent abnormal stress test with no obstructive CAD on cardiac catheterization.  Uptitrate Toprol per above.  He was advised to utilize PRN SL NTG for chest discomfort, and to proceed to the ED for severe symptoms or chest discomfort which does not resolve after 1-2 nitroglycerin.    Coronary artery disease involving native coronary artery of native heart, unspecified whether angina present  CAD s/p kissing stents/SAVANNAH x 2 to OM2/LCx (2024)  Continue DAPT with aspirin and Plavix.  Importance of continuing strict compliance with DAPT reviewed with patient.  Recommend reassessing length of DAPT at next cardiology appointment.  Continue Lipitor, losartan, uptitrate Toprol per above.    Primary hypertension  Continue losartan 25 mg daily.  Toprol will be uptitrated per above.  Ambulatory BP monitoring and low-sodium diet advised.    Mixed hyperlipidemia  Continue Lipitor         Follow-up: 6 weeks or sooner as  needed.   All questions and concerns addressed.   Patient was advised to notify our office with onset of new or worsening cardiac symptoms.    1. Palpitations  POCT ECG    CBC and Platelet    Comprehensive metabolic panel    Lipid Panel with Direct LDL reflex    Magnesium    TSH + Free T4    AMB event recorder    Echo complete w/ contrast if indicated    metoprolol succinate (TOPROL-XL) 25 mg 24 hr tablet      2. Chest discomfort  metoprolol succinate (TOPROL-XL) 25 mg 24 hr tablet      3. Coronary artery disease involving native coronary artery of native heart, unspecified whether angina present  Echo complete w/ contrast if indicated    metoprolol succinate (TOPROL-XL) 25 mg 24 hr tablet      4. Primary hypertension        5. Mixed hyperlipidemia        6. NSTEMI (non-ST elevated myocardial infarction) (HCC)  DISCONTINUED: metoprolol succinate (TOPROL-XL) 50 mg 24 hr tablet          History of Present Illness:     Adryan Agee is a 59 y.o. male with PMHx of CAD s/p circumflex kissing stents in the setting of MI, hypertension, hyperlipidemia, tendency for bradycardia, hyperlipidemia, DM2 (A1c 6.6) who presents for evaluation of palpitations.  This patient is known to Dr. Combs.    Patient called cardiology office recently due to new onset palpitations as a sensation of fluttering in the chest.  He reports he has been experiencing palpitations as a sensation of extra heartbeats, heart racing, and fluttering, that is new onset approximately 1 week ago.  He notes associated shortness of breath, lightheadedness, and chest heaviness.  He reports the symptoms occur intermittently at rest, however have also been brought on with exertion.  He reports the episodes last for approximately 10 minutes before resolving on their own.  He denies syncope.  He has not trialed taking nitroglycerin for his symptoms.      He endorses compliance with his home medication, and endorses compliance with DAPT and reports he has not  missed any doses of DAPT.  He denies bleeding issues.    He does also note anxiety, and notes that he drinks 3-4 cups of coffee daily and does not stay well-hydrated.    Recent labs and cardiac testing reviewed.       Social History: Denies tobacco use, alcohol use, recreational drug use.     Recent Cardiac Work-Up:  Cardiac catheterization 10/2024: Widely patent proximal and distal LCx and OM2 stents.  Nonobstructive mild 30% stenosis of proximal-mid LCx in between stents, RCA large with slow flow.  LAD widely patent.  Exercise MPI 8/2024: Small mild intensity inferior basal defect that is partially reversible consistent with inferobasal infarct with lianet-infarct ischemia.  Inferobasal hypokinesis.  He was hospitalized 1/2024 with chest discomfort and right arm pain.  He was noted to have elevated troponins, and underwent cardiac catheterization which revealed 75% marginal lesion and 100% stenosis of proximal-mid LCx.  He had kissing stents placed to OM2 and mid LCx.  Echocardiogram demonstrated EF 55%.      Review of Systems:   Review of Systems   Constitutional:  Negative for diaphoresis, fever and unexpected weight change.   HENT:  Negative for ear pain and sore throat.    Eyes:  Negative for pain and redness.   Respiratory:  Positive for shortness of breath. Negative for cough.    Cardiovascular:  Positive for chest pain and palpitations. Negative for leg swelling.   Gastrointestinal:  Negative for blood in stool and vomiting.   Genitourinary:  Negative for hematuria.   Skin:  Negative for color change and rash.   Neurological:  Positive for light-headedness. Negative for syncope.   Hematological:  Does not bruise/bleed easily.   Psychiatric/Behavioral:  Negative for agitation and behavioral problems.    All other systems reviewed and are negative.        Vitals:  Vitals:    01/28/25 1108   BP: 138/84   BP Location: Left arm   Patient Position: Sitting   Cuff Size: Standard   Pulse: 79   Resp: 16   SpO2: 92%  "  Weight: 94.3 kg (208 lb)   Height: 5' 11\" (1.803 m)        Body mass index is 29.01 kg/m².    Weight (last 2 days)       Date/Time Weight    01/28/25 1108 94.3 (208)              Physical Exam:   GEN: Alert and oriented x 3, in no acute distress.  Well appearing and well nourished.   HEENT: Sclera anicteric, conjunctivae pink, mucous membranes moist. Oropharynx clear.   NECK: Supple, no carotid bruits, no significant JVD. Trachea midline.   HEART: Regular rhythm, normal S1 and S2, no murmurs, clicks, gallops or rubs. PMI nondisplaced, no thrills.   LUNGS: Clear to auscultation bilaterally; no wheezes, rales, or rhonchi. No increased work of breathing or signs of respiratory distress.   ABDOMEN: Soft, nontender, nondistended.   EXTREMITIES: Skin warm and well perfused, no clubbing, cyanosis, or edema.  NEURO: No focal findings. Normal speech. Mood and affect normal.   SKIN: Normal without suspicious lesions on exposed skin.      EKG Reviewed Personally: 1/28/2025: Sinus rhythm    The ASCVD Risk score (Ian DK, et al., 2019) failed to calculate for the following reasons:    Risk score cannot be calculated because patient has a medical history suggesting prior/existing ASCVD    Active Problems:  Patient Active Problem List   Diagnosis    Chest pain, unspecified    Elevated troponin    Mixed hyperlipidemia    Cervical spondylolysis    Coronary artery disease involving native heart without angina pectoris    Primary hypertension    Pure hypercholesterolemia       Past Medical History:  Past Medical History:   Diagnosis Date    Hypertension     MI (myocardial infarction) (HCC)     Reflux gastritis          Past Surgical History:  Past Surgical History:   Procedure Laterality Date    CARDIAC CATHETERIZATION N/A 01/05/2024    Procedure: Cardiac catheterization;  Surgeon: Quirino Combs MD;  Location: [facility];  Service: Cardiology    CARDIAC CATHETERIZATION Left 01/05/2024    Procedure: Cardiac Left Heart " Cath;  Surgeon: Quirino Combs MD;  Location: [facility];  Service: Cardiology    CARDIAC CATHETERIZATION N/A 01/05/2024    Procedure: Cardiac Coronary Angiogram;  Surgeon: Quirino Combs MD;  Location: [facility];  Service: Cardiology    CARDIAC CATHETERIZATION N/A 01/05/2024    Procedure: Cardiac pci;  Surgeon: Quirino Combs MD;  Location: [facility];  Service: Cardiology    CARDIAC CATHETERIZATION Left 10/18/2024    Procedure: Cardiac Left Heart Cath;  Surgeon: Brent Lacey MD;  Location: [facility];  Service: Cardiology    CARDIAC CATHETERIZATION N/A 10/18/2024    Procedure: Cardiac Coronary Angiogram;  Surgeon: Brent Lacey MD;  Location: [facility];  Service: Cardiology    CORONARY ANGIOPLASTY WITH STENT PLACEMENT  01/05/2024    SAVANNAH x 2  mCX/pOM2    NOSE SURGERY           Family History:  History reviewed. No pertinent family history.      Social History:  Social History     Socioeconomic History    Marital status:      Spouse name: None    Number of children: None    Years of education: None    Highest education level: None   Occupational History    None   Tobacco Use    Smoking status: Never    Smokeless tobacco: Never   Vaping Use    Vaping status: Never Used   Substance and Sexual Activity    Alcohol use: Never    Drug use: Never    Sexual activity: None   Other Topics Concern    None   Social History Narrative    ** Merged History Encounter **          Social Drivers of Health     Financial Resource Strain: Not on file   Food Insecurity: Not on file   Transportation Needs: Not on file   Physical Activity: Not on file   Stress: Not on file   Social Connections: Unknown (6/18/2024)    Received from [employer]    Social Connections     How often do you feel lonely or isolated from those around you? (Adult - for ages 18 years and over): Not on file   Intimate Partner Violence: Not on file   Housing Stability: Not on file  "          The following portions of the patient's history were reviewed and updated as appropriate: past medical history, past surgical history, past family history,  past social history, current medications, allergies and problem list.      Imaging:   No results found.      Laboratory Results:  CBC with diff:   Lab Results   Component Value Date    WBC 7.90 06/20/2024    RBC 5.14 06/20/2024    HGB 14.5 06/20/2024    HCT 43.8 06/20/2024    MCV 85 06/20/2024    MCH 28.2 06/20/2024    RDW 13.3 06/20/2024     06/20/2024       CMP:  Lab Results   Component Value Date    CREATININE 0.99 06/20/2024    BUN 20 06/20/2024    K 3.8 06/20/2024     06/20/2024    CO2 24 06/20/2024    ALKPHOS 117 (H) 06/20/2024    ALT 41 06/20/2024    AST 31 06/20/2024       Lab Results   Component Value Date    HGBA1C 6.6 (H) 01/05/2024    MG 2.1 01/06/2024       No results found for: \"TROPONINI\", \"CKMB\", \"CKTOTAL\"    Lipid Profile:   No results found for: \"CHOL\"  Lab Results   Component Value Date    HDL 47 01/05/2024     Lab Results   Component Value Date    LDLCALC 175 (H) 01/05/2024     Lab Results   Component Value Date    TRIG 224 (H) 01/05/2024       Cardiac testing:   Results for orders placed during the hospital encounter of 01/04/24    Echo complete w/ contrast if indicated    Interpretation Summary    Left Ventricle: Left ventricular cavity size is normal. Wall thickness is normal. The left ventricular ejection fraction is 55%. Systolic function is normal. Wall motion is normal. Diastolic function is normal.    No results found for this or any previous visit.    No results found for this or any previous visit.    Results for orders placed during the hospital encounter of 08/07/24    NM myocardial perfusion spect (stress and/or rest)    Interpretation Summary  1.  Resting EKG is normal and there is no significant change with exercise  2.  Fair exercise tolerance-10.1 METS  3.  No chest discomfort with exercise  4.  There " is a small mild intensity inferobasal defect that is partially reversible consistent with inferobasal infarct with lianet-infarct ischemia  5.  Inferobasal hypokinesis.  Calculated ejection fraction 67%    No results found for this or any previous visit.    No results found for this or any previous visit.    No results found for this or any previous visit.    No results found for this or any previous visit.        Medications:    Current Outpatient Medications:     aspirin 81 mg chewable tablet, Chew 1 tablet (81 mg total) daily, Disp: , Rfl:     atorvastatin (LIPITOR) 80 mg tablet, Take 0.5 tablets (40 mg total) by mouth daily with dinner, Disp: , Rfl:     citalopram (CeleXA) 20 mg tablet, Take 20 mg by mouth daily, Disp: , Rfl:     clopidogrel (PLAVIX) 75 mg tablet, Take 1 tablet (75 mg total) by mouth daily Do not start before January 7, 2024., Disp: 30 tablet, Rfl: 1    diclofenac sodium (VOLTAREN) 50 mg EC tablet, Take 50 mg by mouth 3 (three) times a day as needed, Disp: , Rfl:     hydrOXYzine HCL (ATARAX) 25 mg tablet, Take 1 tablet (25 mg total) by mouth every 6 (six) hours as needed for itching, Disp: 30 tablet, Rfl: 0    losartan (COZAAR) 25 mg tablet, TAKE 1 TABLET BY MOUTH DAILY, Disp: 90 tablet, Rfl: 1    metoprolol succinate (TOPROL-XL) 25 mg 24 hr tablet, Take 1 tablet (25 mg total) by mouth 2 (two) times a day, Disp: , Rfl:     nitroglycerin (NITROSTAT) 0.4 mg SL tablet, Place 1 tablet (0.4 mg total) under the tongue every 5 (five) minutes as needed for chest pain (monitor blood pressure.), Disp: 30 tablet, Rfl: 0    omeprazole (PriLOSEC) 40 MG capsule, Take 40 mg by mouth daily, Disp: , Rfl:     ondansetron (ZOFRAN-ODT) 4 mg disintegrating tablet, Take 1 tablet (4 mg total) by mouth every 6 (six) hours as needed for nausea or vomiting, Disp: 20 tablet, Rfl: 0      Allergies:  Allergies   Allergen Reactions    Toradol [Ketorolac Tromethamine] Hives    Ketorolac Rash           Recommend aggressive risk  factor modification and therapeutic lifestyle changes.  Low-salt, low-calorie, low-fat, low-cholesterol diet with regular exercise and to optimize weight.    Discussed concepts of cardiovascular disease, including signs and symptoms of heart disease.    Previous studies were reviewed.    Safety measures were reviewed.  Questions were entertained and answered.  Patient was advised to report any problems requiring medical attention.    Follow-up with PCP and appropriate specialist and lab work/testing as discussed.    Return for follow up visit as scheduled or earlier, if needed.  Thank you for allowing me to participate in the care and evaluation of your patient.  Should you have any questions, please feel free to contact me.    Devon Johnson PA-C  1/28/2025,5:37 PM      ** Please Note: Fluency DirectDictation voice to text software may have been used in the creation of this document. **

## 2025-01-28 NOTE — ASSESSMENT & PLAN NOTE
CAD s/p kissing stents/SAVANNAH x 2 to OM2/LCx (1/2024)  Continue DAPT with aspirin and Plavix.  Importance of continuing strict compliance with DAPT reviewed with patient.  Recommend reassessing length of DAPT at next cardiology appointment.  Continue Lipitor, losartan, uptitrate Toprol per above.

## 2025-02-05 ENCOUNTER — CLINICAL SUPPORT (OUTPATIENT)
Dept: CARDIOLOGY CLINIC | Facility: CLINIC | Age: 60
End: 2025-02-05
Payer: COMMERCIAL

## 2025-02-05 DIAGNOSIS — R00.2 PALPITATIONS: ICD-10-CM

## 2025-02-05 PROCEDURE — 93272 ECG/REVIEW INTERPRET ONLY: CPT | Performed by: INTERNAL MEDICINE

## 2025-02-06 ENCOUNTER — RESULTS FOLLOW-UP (OUTPATIENT)
Dept: NON INVASIVE DIAGNOSTICS | Facility: HOSPITAL | Age: 60
End: 2025-02-06

## 2025-02-06 DIAGNOSIS — I25.10 CORONARY ARTERY DISEASE INVOLVING NATIVE CORONARY ARTERY OF NATIVE HEART, UNSPECIFIED WHETHER ANGINA PRESENT: ICD-10-CM

## 2025-02-06 DIAGNOSIS — R07.89 CHEST DISCOMFORT: ICD-10-CM

## 2025-02-06 DIAGNOSIS — R00.2 PALPITATIONS: ICD-10-CM

## 2025-02-06 RX ORDER — METOPROLOL SUCCINATE 25 MG/1
25 TABLET, EXTENDED RELEASE ORAL 2 TIMES DAILY
Qty: 180 TABLET | Refills: 1 | Status: SHIPPED | OUTPATIENT
Start: 2025-02-06

## 2025-02-06 NOTE — TELEPHONE ENCOUNTER
Was written on 1/28/24 but never sent to pharmacy Patient needs in couple of days running out please resend not a duplicate     Reason for call:   [x] Refill   [] Prior Auth  [] Other:     Office:   [] PCP/Provider -   [x] Specialty/Provider - Devon Johnson    Medication: Metoprolol Succinate XL     Dose/Frequency: 25 mg BID    Quantity: 180    [facility]     Does the patient have enough for 3 days?   [] Yes   [x] No - Send as HP to POD

## 2025-02-07 ENCOUNTER — HOSPITAL ENCOUNTER (OUTPATIENT)
Dept: NON INVASIVE DIAGNOSTICS | Facility: CLINIC | Age: 60
Discharge: HOME | End: 2025-02-07
Payer: COMMERCIAL

## 2025-02-07 VITALS
HEART RATE: 75 BPM | SYSTOLIC BLOOD PRESSURE: 138 MMHG | BODY MASS INDEX: 29.12 KG/M2 | HEIGHT: 71 IN | WEIGHT: 208 LBS | DIASTOLIC BLOOD PRESSURE: 84 MMHG

## 2025-02-07 DIAGNOSIS — R00.2 PALPITATIONS: ICD-10-CM

## 2025-02-07 DIAGNOSIS — I25.10 CORONARY ARTERY DISEASE INVOLVING NATIVE CORONARY ARTERY OF NATIVE HEART, UNSPECIFIED WHETHER ANGINA PRESENT: ICD-10-CM

## 2025-02-07 LAB
AORTIC ROOT: 3.8 CM
ASCENDING AORTA: 3.1 CM
AV LVOT MEAN GRADIENT: 2 MMHG
AV LVOT PEAK GRADIENT: 3 MMHG
BSA FOR ECHO PROCEDURE: 2.14 M2
DOP CALC LVOT PEAK VEL VTI: 17.76 CM
DOP CALC LVOT PEAK VEL: 0.92 M/S
E WAVE DECELERATION TIME: 190 MS
E/A RATIO: 0.87
FRACTIONAL SHORTENING: 34 (ref 28–44)
INTERVENTRICULAR SEPTUM IN DIASTOLE (PARASTERNAL SHORT AXIS VIEW): 1.2 CM
INTERVENTRICULAR SEPTUM: 1.2 CM (ref 0.6–1.1)
LAAS-AP2: 16.3 CM2
LAAS-AP4: 12.1 CM2
LEFT ATRIUM SIZE: 3.7 CM
LEFT ATRIUM VOLUME (MOD BIPLANE): 29 ML
LEFT ATRIUM VOLUME INDEX (MOD BIPLANE): 13.6 ML/M2
LEFT INTERNAL DIMENSION IN SYSTOLE: 2.7 CM (ref 2.1–4)
LEFT VENTRICULAR INTERNAL DIMENSION IN DIASTOLE: 4.1 CM (ref 3.5–6)
LEFT VENTRICULAR POSTERIOR WALL IN END DIASTOLE: 1.3 CM
LEFT VENTRICULAR STROKE VOLUME: 45 ML
LV EF US.2D.A4C+ESTIMATED: 58 %
LVSV (TEICH): 45 ML
MV E'TISSUE VEL-SEP: 8 CM/S
MV PEAK A VEL: 0.78 M/S
MV PEAK E VEL: 68 CM/S
MV STENOSIS PRESSURE HALF TIME: 55 MS
MV VALVE AREA P 1/2 METHOD: 4
RIGHT ATRIUM AREA SYSTOLE A4C: 12.4 CM2
RIGHT VENTRICLE ID DIMENSION: 3.8 CM
SL CV LEFT ATRIUM LENGTH A2C: 5.2 CM
SL CV LV EF: 60
SL CV PED ECHO LEFT VENTRICLE DIASTOLIC VOLUME (MOD BIPLANE) 2D: 73 ML
SL CV PED ECHO LEFT VENTRICLE SYSTOLIC VOLUME (MOD BIPLANE) 2D: 28 ML
TRICUSPID ANNULAR PLANE SYSTOLIC EXCURSION: 2.1 CM

## 2025-02-07 PROCEDURE — 93306 TTE W/DOPPLER COMPLETE: CPT

## 2025-02-07 PROCEDURE — 93306 TTE W/DOPPLER COMPLETE: CPT | Performed by: INTERNAL MEDICINE

## 2025-03-11 ENCOUNTER — OFFICE VISIT (OUTPATIENT)
Dept: CARDIOLOGY CLINIC | Facility: CLINIC | Age: 60
End: 2025-03-11
Payer: COMMERCIAL

## 2025-03-11 VITALS
HEIGHT: 71 IN | HEART RATE: 78 BPM | DIASTOLIC BLOOD PRESSURE: 84 MMHG | OXYGEN SATURATION: 97 % | SYSTOLIC BLOOD PRESSURE: 128 MMHG | RESPIRATION RATE: 16 BRPM | BODY MASS INDEX: 29.68 KG/M2 | WEIGHT: 212 LBS

## 2025-03-11 DIAGNOSIS — E78.2 MIXED HYPERLIPIDEMIA: ICD-10-CM

## 2025-03-11 DIAGNOSIS — R00.2 PALPITATIONS: ICD-10-CM

## 2025-03-11 DIAGNOSIS — I25.10 CORONARY ARTERY DISEASE INVOLVING NATIVE CORONARY ARTERY OF NATIVE HEART WITHOUT ANGINA PECTORIS: ICD-10-CM

## 2025-03-11 DIAGNOSIS — R07.89 CHEST DISCOMFORT: Primary | ICD-10-CM

## 2025-03-11 DIAGNOSIS — I10 PRIMARY HYPERTENSION: ICD-10-CM

## 2025-03-11 PROCEDURE — 99214 OFFICE O/P EST MOD 30 MIN: CPT

## 2025-03-11 RX ORDER — METOPROLOL SUCCINATE 25 MG/1
50 TABLET, EXTENDED RELEASE ORAL DAILY
Start: 2025-03-11

## 2025-03-11 RX ORDER — LOSARTAN POTASSIUM 25 MG/1
25 TABLET ORAL DAILY
Qty: 90 TABLET | Refills: 1 | Status: SHIPPED | OUTPATIENT
Start: 2025-03-11

## 2025-03-11 NOTE — ASSESSMENT & PLAN NOTE
CAD s/p kissing stents/SAVANNAH x 2 to OM2/LCx (1/2024)   Continue DAPT with aspirin and Plavix.  DAPT score 2.  He does express interest in transitioning to antiplatelet monotherapy, given increased bruising on DAPT.  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  Message sent to patient's primary cardiologist to determine if it is safe to discontinue DAPT at this time.  Continue Lipitor, Toprol

## 2025-03-11 NOTE — ASSESSMENT & PLAN NOTE
Continue losartan 25 mg daily, Toprol 50 mg daily.  Ambulatory BP monitoring and low-sodium diet advised.

## 2025-03-11 NOTE — PROGRESS NOTES
CARDIOLOGY OFFICE VISIT  [facility]  [address] [place], [place] [id number]  [address] [place] [id number]  Tel: [phone]      NAME: Adryan Agee  AGE: 59 y.o.  SEX: male  : 1965  MRN: [id number]      Chief Complaint:  Chief Complaint   Patient presents with    Follow-up       Assessment and Plan:    Assessment & Plan  Chest discomfort  He has been experiencing episodic chest discomfort and palpitations with associated dyspnea, lightheadedness.  He reports his symptoms have improved significantly since uptitration of Toprol, and are continuing to improve.  His symptoms may be related to his palpitations, versus small vessel CAD.  We discussed further uptitration of Toprol versus initiation of amlodipine for further management of his symptoms.  Since his symptoms are continually improving, he would prefer to remain on his current medication regimen and continue to monitor his symptoms.  He was advised to notify our office if his symptoms change, worsen, or fail to improve further.  Continue Toprol 50 mg daily  He was advised utilize PRN SL NTG as needed for symptoms  He was advised to also discuss his symptoms with his PCP, to investigate potential noncardiac etiologies.    Palpitations  These episodes correlate with chest discomfort, SOB, and lightheadedness, as noted above.  He notes that they have improved significantly after uptitration of Toprol.  His symptoms intermittently correlated with PVCs on recent event, although this was not consistent.  Continue Toprol 50 mg daily  He was advised to obtain the previously ordered labs.  He was advised to follow-up with his PCP to investigate potential noncardiac etiologies.  He was advised to obtain the previously ordered labs.    Coronary artery disease involving native coronary artery of native heart without angina pectoris  CAD s/p kissing stents/SAVANNAH x 2 to OM2/LCx (2024)   Continue DAPT with aspirin and  Plavix.  DAPT score 2.  He does express interest in transitioning to antiplatelet monotherapy, given increased bruising on DAPT.  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  Message sent to patient's primary cardiologist to determine if it is safe to discontinue DAPT at this time.  Continue Lipitor, Toprol    Primary hypertension  Continue losartan 25 mg daily, Toprol 50 mg daily.  Ambulatory BP monitoring and low-sodium diet advised.    Mixed hyperlipidemia  Continue Lipitor 40 mg daily           Follow-up: 2 months or sooner as needed.   All questions and concerns addressed.   Patient was advised to notify our office with onset of new or worsening cardiac symptoms.    1. Chest discomfort  metoprolol succinate (TOPROL-XL) 25 mg 24 hr tablet      2. Palpitations  metoprolol succinate (TOPROL-XL) 25 mg 24 hr tablet      3. Coronary artery disease involving native coronary artery of native heart without angina pectoris        4. Primary hypertension  losartan (COZAAR) 25 mg tablet      5. Mixed hyperlipidemia            History of Present Illness:     Adryan Agee is a 59 y.o. male with PMHx of CAD s/p circumflex kissing stents in the setting of MI, hypertension, hyperlipidemia, tendency for bradycardia, hyperlipidemia, DM2 (A1c 6.6) who presents for evaluation of palpitations.  This patient is known to Dr. Combs.     At his last cardiology appointment, he noted palpitations with associated shortness of breath, lightheadedness, and chest heaviness, which he noted could be brought on with rest or with exertion.  Toprol was uptitrated for management of palpitations and for management of possible small vessel CAD.    He reports he is feeling better, and that the episodes have improved. He notes the episodes are predominantly a chest heaviness with associated palpitations, lightheadedness, and dyspnea. These symptoms come on with rest. These symptoms are not brought on by  activity, but he does note he is more fatigued with activity. They usually last for a few minutes before resolving on their own. He feels like increasing the Toprol helped. He denies positional lightheadedness or syncopal episodes. Denies LE edema. They deny hematuria, hematochezia, melena, significant bruising/bleeding. He notes cold feet after increasing Toprol. He notes the above episodes have been ongoing since at least August. He has noted some difficulty with balance lately, which goes away on its own, and was advised to discuss this with his PCP. He never tried NTG for his symptoms. Does have a history of anxiety, but denies anxiety or stress as preceding symptom. Has had anxiety attacks in the past but it doesn't feel like this. He feels his symptoms are continually improving, and overall much better. He denies missing any doses of aspirin and Plavix since stent placement. He does work in construction.  He denies hematuria, hematochezia, melena on DAPT, although does note easier bruising/bleeding.    Recent labs and cardiac testing reviewed.   He has not recently checked the blood pressure at home.      Social History: He denies tobacco use, alcohol use, or recreational drug use.    Recent Cardiac Work-Up:  Event monitor 2/5/2025: Sinus rhythm with average heart rate 67 bpm.  1% PVC burden.  His symptoms of heart racing and skipped beat intermittently correlated with PVCs, although this was not consistent and he also experienced a symptomatic PVCs.  Echocardiogram 2/7/2025: EF 60%  Cardiac catheterization 10/2024: Widely patent proximal and distal LCx and OM2 stents.  Nonobstructive mild 30% stenosis of proximal-mid LCx in between stents, RCA large with slow flow.  LAD widely patent.  Exercise MPI 8/2024: Small mild intensity inferior basal defect that is partially reversible consistent with inferobasal infarct with lianet-infarct ischemia.  Inferobasal hypokinesis.  He was hospitalized 1/2024 with chest  "discomfort and right arm pain.  He was noted to have elevated troponins, and underwent cardiac catheterization which revealed 75% marginal lesion and 100% stenosis of proximal-mid LCx.  He had kissing stents placed to OM2 and mid LCx.  Echocardiogram demonstrated EF 55%.      Review of Systems:   Review of Systems   Constitutional:  Negative for diaphoresis, fever and unexpected weight change.   HENT:  Negative for ear pain and sore throat.    Eyes:  Negative for pain and redness.   Respiratory:  Positive for shortness of breath (Improving). Negative for cough.    Cardiovascular:  Positive for chest pain (Improving) and palpitations (Improving). Negative for leg swelling.   Gastrointestinal:  Negative for blood in stool and vomiting.   Genitourinary:  Negative for hematuria.   Skin:  Negative for color change and rash.   Neurological:  Positive for dizziness and light-headedness (Improving). Negative for syncope.   Hematological:  Bruises/bleeds easily.   Psychiatric/Behavioral:  Negative for agitation and behavioral problems.    All other systems reviewed and are negative.        Vitals:  Vitals:    03/11/25 0906   BP: 128/84   BP Location: Right arm   Patient Position: Sitting   Cuff Size: Standard   Pulse: 78   Resp: 16   SpO2: 97%   Weight: 96.2 kg (212 lb)   Height: 5' 11\" (1.803 m)        Body mass index is 29.57 kg/m².    Weight (last 2 days)       Date/Time Weight    03/11/25 0906 96.2 (212)              Physical Exam:   GEN: Alert and oriented x 3, in no acute distress.  Well appearing and well nourished.   HEENT: Sclera anicteric, conjunctivae pink, mucous membranes moist. Oropharynx clear.   NECK: Supple, no carotid bruits, no significant JVD. Trachea midline.   HEART: Regular rhythm, normal S1 and S2, no murmurs, clicks, gallops or rubs. PMI nondisplaced, no thrills.   LUNGS: Clear to auscultation bilaterally; no wheezes, rales, or rhonchi. No increased work of breathing or signs of respiratory distress. "   ABDOMEN: Soft, nontender, nondistended.   EXTREMITIES: Skin warm and well perfused, no clubbing, cyanosis, or edema.  NEURO: No focal findings. Normal speech. Mood and affect normal.   SKIN: Normal without suspicious lesions on exposed skin.      EKG Reviewed Personally: 1/20/2025: Sinus rhythm    The ASCVD Risk score (Ian REILLY, et al., 2019) failed to calculate for the following reasons:    Risk score cannot be calculated because patient has a medical history suggesting prior/existing ASCVD    Active Problems:  Patient Active Problem List   Diagnosis    Chest pain, unspecified    Elevated troponin    Mixed hyperlipidemia    Cervical spondylolysis    Coronary artery disease involving native heart without angina pectoris    Primary hypertension    Pure hypercholesterolemia       Past Medical History:  Past Medical History:   Diagnosis Date    Hypertension     MI (myocardial infarction) (HCC)     Reflux gastritis          Past Surgical History:  Past Surgical History:   Procedure Laterality Date    CARDIAC CATHETERIZATION N/A 01/05/2024    Procedure: Cardiac catheterization;  Surgeon: Quirino Combs MD;  Location: [facility];  Service: Cardiology    CARDIAC CATHETERIZATION Left 01/05/2024    Procedure: Cardiac Left Heart Cath;  Surgeon: Quirino Combs MD;  Location: [facility];  Service: Cardiology    CARDIAC CATHETERIZATION N/A 01/05/2024    Procedure: Cardiac Coronary Angiogram;  Surgeon: Quirino Combs MD;  Location: [facility];  Service: Cardiology    CARDIAC CATHETERIZATION N/A 01/05/2024    Procedure: Cardiac pci;  Surgeon: Quirino Combs MD;  Location: [facility];  Service: Cardiology    CARDIAC CATHETERIZATION Left 10/18/2024    Procedure: Cardiac Left Heart Cath;  Surgeon: Brent Lacey MD;  Location: [facility];  Service: Cardiology    CARDIAC CATHETERIZATION N/A 10/18/2024    Procedure: Cardiac Coronary Angiogram;  Surgeon: Brent Gerard  MD Abhijit;  Location: [facility];  Service: Cardiology    CORONARY ANGIOPLASTY WITH STENT PLACEMENT  01/05/2024    SAVANNAH x 2  mCX/pOM2    NOSE SURGERY           Family History:  History reviewed. No pertinent family history.      Social History:  Social History     Socioeconomic History    Marital status:      Spouse name: None    Number of children: None    Years of education: None    Highest education level: None   Occupational History    None   Tobacco Use    Smoking status: Never    Smokeless tobacco: Never   Vaping Use    Vaping status: Never Used   Substance and Sexual Activity    Alcohol use: Never    Drug use: Never    Sexual activity: None   Other Topics Concern    None   Social History Narrative    ** Merged History Encounter **          Social Drivers of Health     Financial Resource Strain: Not on file   Food Insecurity: Not on file   Transportation Needs: Not on file   Physical Activity: Not on file   Stress: Not on file   Social Connections: Unknown (6/18/2024)    Received from [employer]     How often do you feel lonely or isolated from those around you? (Adult - for ages 18 years and over): Not on file   Intimate Partner Violence: Not on file   Housing Stability: Not on file           The following portions of the patient's history were reviewed and updated as appropriate: past medical history, past surgical history, past family history,  past social history, current medications, allergies and problem list.      Imaging:   No results found.      Laboratory Results:  CBC with diff:   Lab Results   Component Value Date    WBC 7.90 06/20/2024    RBC 5.14 06/20/2024    HGB 14.5 06/20/2024    HCT 43.8 06/20/2024    MCV 85 06/20/2024    MCH 28.2 06/20/2024    RDW 13.3 06/20/2024     06/20/2024       CMP:  Lab Results   Component Value Date    CREATININE 0.99 06/20/2024    BUN 20 06/20/2024    K 3.8 06/20/2024     06/20/2024    CO2 24 06/20/2024    ALKPHOS 117  "(H) 06/20/2024    ALT 41 06/20/2024    AST 31 06/20/2024       Lab Results   Component Value Date    HGBA1C 6.6 (H) 01/05/2024    MG 2.1 01/06/2024       No results found for: \"TROPONINI\", \"CKMB\", \"CKTOTAL\"    Lipid Profile:   No results found for: \"CHOL\"  Lab Results   Component Value Date    HDL 47 01/05/2024     Lab Results   Component Value Date    LDLCALC 175 (H) 01/05/2024     Lab Results   Component Value Date    TRIG 224 (H) 01/05/2024       Cardiac testing:   Results for orders placed during the hospital encounter of 02/07/25    Echo complete w/ contrast if indicated    Interpretation Summary    Left Ventricle: Left ventricular cavity size is normal. Wall thickness is mildly increased. There is mild concentric hypertrophy. The left ventricular ejection fraction is 60%. Systolic function is normal. Wall motion is normal. Diastolic function is normal.    Aortic Valve: There is trace regurgitation.    Mitral Valve: There is trace regurgitation.    Technically difficult study.    No significant change since prior echo 1/25/2024.    No results found for this or any previous visit.    No results found for this or any previous visit.    Results for orders placed during the hospital encounter of 08/07/24    NM myocardial perfusion spect (stress and/or rest)    Interpretation Summary  1.  Resting EKG is normal and there is no significant change with exercise  2.  Fair exercise tolerance-10.1 METS  3.  No chest discomfort with exercise  4.  There is a small mild intensity inferobasal defect that is partially reversible consistent with inferobasal infarct with lianet-infarct ischemia  5.  Inferobasal hypokinesis.  Calculated ejection fraction 67%    No results found for this or any previous visit.    No results found for this or any previous visit.    No results found for this or any previous visit.    No results found for this or any previous visit.        Medications:    Current Outpatient Medications:     aspirin 81 " mg chewable tablet, Chew 1 tablet (81 mg total) daily, Disp: , Rfl:     atorvastatin (LIPITOR) 80 mg tablet, Take 0.5 tablets (40 mg total) by mouth daily with dinner, Disp: , Rfl:     citalopram (CeleXA) 20 mg tablet, Take 20 mg by mouth daily, Disp: , Rfl:     clopidogrel (PLAVIX) 75 mg tablet, Take 1 tablet (75 mg total) by mouth daily Do not start before January 7, 2024., Disp: 30 tablet, Rfl: 1    diclofenac sodium (VOLTAREN) 50 mg EC tablet, Take 50 mg by mouth 3 (three) times a day as needed, Disp: , Rfl:     hydrOXYzine HCL (ATARAX) 25 mg tablet, Take 1 tablet (25 mg total) by mouth every 6 (six) hours as needed for itching, Disp: 30 tablet, Rfl: 0    losartan (COZAAR) 25 mg tablet, Take 1 tablet (25 mg total) by mouth daily, Disp: 90 tablet, Rfl: 1    metoprolol succinate (TOPROL-XL) 25 mg 24 hr tablet, Take 2 tablets (50 mg total) by mouth daily, Disp: , Rfl:     nitroglycerin (NITROSTAT) 0.4 mg SL tablet, Place 1 tablet (0.4 mg total) under the tongue every 5 (five) minutes as needed for chest pain (monitor blood pressure.), Disp: 30 tablet, Rfl: 0    omeprazole (PriLOSEC) 40 MG capsule, Take 40 mg by mouth daily, Disp: , Rfl:     ondansetron (ZOFRAN-ODT) 4 mg disintegrating tablet, Take 1 tablet (4 mg total) by mouth every 6 (six) hours as needed for nausea or vomiting, Disp: 20 tablet, Rfl: 0      Allergies:  Allergies   Allergen Reactions    Toradol [Ketorolac Tromethamine] Hives    Ketorolac Rash           Recommend aggressive risk factor modification and therapeutic lifestyle changes.  Low-salt, low-calorie, low-fat, low-cholesterol diet with regular exercise and to optimize weight.    Discussed concepts of cardiovascular disease, including signs and symptoms of heart disease.    Previous studies were reviewed.    Safety measures were reviewed.  Questions were entertained and answered.  Patient was advised to report any problems requiring medical attention.    Follow-up with PCP and appropriate  specialist and lab work/testing as discussed.    Return for follow up visit as scheduled or earlier, if needed.  Thank you for allowing me to participate in the care and evaluation of your patient.  Should you have any questions, please feel free to contact me.    Devon Johnson PA-C  3/11/2025,6:25 PM      ** Please Note: Fluency DirectDictation voice to text software may have been used in the creation of this document. **

## 2025-03-19 ENCOUNTER — TELEPHONE (OUTPATIENT)
Dept: CARDIOLOGY CLINIC | Facility: CLINIC | Age: 60
End: 2025-03-19

## 2025-03-19 NOTE — TELEPHONE ENCOUNTER
Patient stated that when he takes the Hydroxyzine 25 mg and Atorvastatin 80 mg together the itching stops.     Patient stated that he did not take his BP or HR today.    Note below from 2/20/24 encounter:     Vital signs:   2/18:156/104, HR 92   2/19: 154/97, HR 84   show

## 2025-03-28 ENCOUNTER — NURSE TRIAGE (OUTPATIENT)
Age: 60
End: 2025-03-28

## 2025-03-28 NOTE — TELEPHONE ENCOUNTER
"FOLLOW UP: Pt feels that the increase in metoprolol is causing his to have leg pain and SOB when bending forward     REASON FOR CONVERSATION: medication question    SYMPTOMS: Pt called states about 2 weeks ago he started with having pain in his legs. He states the one day that the pain was so bad that he was afraid to walk. He states also that he was getting SOB when bending forwards    OTHER: Metoprolol 50mg daily     DISPOSITION: Discuss with Provider and Call Back Patient    Like past week   Answer Assessment - Initial Assessment Questions  1. NAME of MEDICINE: \"What medicine(s) are you calling about?\"      Metoprolol   2. QUESTION: \"What is your question?\" (e.g., double dose of medicine, side effect)      Side effect   3. PRESCRIBER: \"Who prescribed the medicine?\" Reason: if prescribed by specialist, call should be referred to that group.      Devon Johnson PA-C  4. SYMPTOMS: \"Do you have any symptoms?\" If Yes, ask: \"What symptoms are you having?\"  \"How bad are the symptoms (e.g., mild, moderate, severe)      Leg pain    Protocols used: Medication Question Call-Adult-[place]    "

## 2025-04-01 NOTE — TELEPHONE ENCOUNTER
Please let him know that metoprolol typically does not cause leg pain, especially when he was tolerating this medication well up until 2 weeks ago.  I recommend he reaches out to his PCP to investigate the leg pain further.  He should take positional changes slowly to see if this helps to alleviate the shortness of breath with bending forwards.

## 2025-05-13 DIAGNOSIS — R07.89 CHEST DISCOMFORT: ICD-10-CM

## 2025-05-13 DIAGNOSIS — R00.2 PALPITATIONS: ICD-10-CM

## 2025-05-13 RX ORDER — METOPROLOL SUCCINATE 25 MG/1
25 TABLET, EXTENDED RELEASE ORAL 2 TIMES DAILY
Qty: 180 TABLET | Refills: 1 | Status: SHIPPED | OUTPATIENT
Start: 2025-05-13

## (undated) DEVICE — CATH GUIDE LAUNCHER 6FR EBU 3.75

## (undated) DEVICE — GLIDESHEATH SLENDER STAINLESS STEEL KIT: Brand: GLIDESHEATH SLENDER

## (undated) DEVICE — DGW .035 FC J3MM 260CM TEF: Brand: EMERALD

## (undated) DEVICE — HI-TORQUE BALANCE MIDDLEWEIGHT GUIDE WIRE .014 J TIP 3.0 CM X 190 CM: Brand: HI-TORQUE BALANCE MIDDLEWEIGHT

## (undated) DEVICE — CATH GUIDE LAUNCHER 6FR EBU 3.5

## (undated) DEVICE — NC TREK NEO™ CORONARY DILATATION CATHETER 3.50 MM X 12 MM / RAPID-EXCHANGE: Brand: NC TREK NEO™

## (undated) DEVICE — TR BAND RADIAL ARTERY COMPRESSION DEVICE: Brand: TR BAND

## (undated) DEVICE — CATH DIAG 5FR .045 100CM FR4

## (undated) DEVICE — RUNTHROUGH NS EXTRA FLOPPY PTCA GUIDEWIRE: Brand: RUNTHROUGH

## (undated) DEVICE — CATH DIAG 5FR IMPULSE 100CM FL3.5

## (undated) DEVICE — RADIFOCUS OPTITORQUE ANGIOGRAPHIC CATHETER: Brand: OPTITORQUE